# Patient Record
Sex: MALE | Race: WHITE | NOT HISPANIC OR LATINO | Employment: OTHER | URBAN - METROPOLITAN AREA
[De-identification: names, ages, dates, MRNs, and addresses within clinical notes are randomized per-mention and may not be internally consistent; named-entity substitution may affect disease eponyms.]

---

## 2017-09-29 ENCOUNTER — OFFICE VISIT (OUTPATIENT)
Dept: INTERNAL MEDICINE | Age: 57
End: 2017-09-29

## 2017-09-29 VITALS
DIASTOLIC BLOOD PRESSURE: 72 MMHG | TEMPERATURE: 97.6 F | OXYGEN SATURATION: 99 % | SYSTOLIC BLOOD PRESSURE: 116 MMHG | HEART RATE: 77 BPM | WEIGHT: 147.6 LBS | HEIGHT: 67 IN | BODY MASS INDEX: 23.17 KG/M2

## 2017-09-29 DIAGNOSIS — F41.9 ANXIETY: ICD-10-CM

## 2017-09-29 DIAGNOSIS — Z00.00 ROUTINE HEALTH MAINTENANCE: Primary | ICD-10-CM

## 2017-09-29 LAB
DEVELOPER EXPIRATION DATE: NORMAL
DEVELOPER LOT NUMBER: NORMAL
EXPIRATION DATE: NORMAL
FECAL OCCULT BLOOD SCREEN, POC: NEGATIVE
Lab: NORMAL
NEGATIVE CONTROL: NEGATIVE
POSITIVE CONTROL: POSITIVE

## 2017-09-29 PROCEDURE — 99396 PREV VISIT EST AGE 40-64: CPT | Performed by: INTERNAL MEDICINE

## 2017-09-29 PROCEDURE — 82270 OCCULT BLOOD FECES: CPT | Performed by: INTERNAL MEDICINE

## 2017-09-29 RX ORDER — FLECAINIDE ACETATE 50 MG/1
50 TABLET ORAL AS NEEDED
Qty: 60 TABLET | Refills: 1 | COMMUNITY
Start: 2017-09-29 | End: 2017-12-04 | Stop reason: SDUPTHER

## 2017-09-29 RX ORDER — CROMOLYN SODIUM 40 MG/ML
1 SOLUTION/ DROPS OPHTHALMIC 2 TIMES DAILY
Refills: 1 | COMMUNITY
Start: 2017-08-26 | End: 2020-11-20 | Stop reason: ALTCHOICE

## 2017-09-29 RX ORDER — PAROXETINE HYDROCHLORIDE 20 MG/1
20 TABLET, FILM COATED ORAL NIGHTLY
Qty: 30 TABLET | Refills: 3 | Status: SHIPPED | OUTPATIENT
Start: 2017-09-29 | End: 2017-10-25 | Stop reason: SDUPTHER

## 2017-09-29 NOTE — PROGRESS NOTES
"Graeme GUILLORY Walker / 57 y.o. / male  09/29/2017  VITALS    /72  Pulse 77  Temp 97.6 °F (36.4 °C)  Ht 67\" (170.2 cm)  Wt 147 lb 9.6 oz (67 kg)  SpO2 99%  BMI 23.12 kg/m2    BP Readings from Last 3 Encounters:   09/29/17 116/72   09/22/16 114/70   01/25/16 120/70     Wt Readings from Last 3 Encounters:   09/29/17 147 lb 9.6 oz (67 kg)   11/04/16 150 lb (68 kg)   09/22/16 145 lb (65.8 kg)      Body mass index is 23.12 kg/(m^2).    CC:  Main reason(s) for today's visit: No chief complaint on file.      HPI:     Patient presents today for annual physical.    Health maintenance: Last ophthalmology visit early 2017, dentist 2017.  Exercise: Walking 3-4 times per week    Patient Care Team:  Hernando Kwok MD as PCP - General  Hernando Kwok MD as PCP - Family Medicine  ____________________________________________________________________    ASSESSMENT & PLAN:    Problem List Items Addressed This Visit     None        No orders of the defined types were placed in this encounter.      Summary/Discussion:     · #1 routine health maintenance, clinically stable mail, see comments above and below, lab as above will follow-up results online.  Recommend repeat exam one year.  ·   · #2 anxiety/insomnia discontinue trazodone changed to Paxil 20 mg at his bedtime.  He is aware that the sleep inducing function will be present immediately, while the antianxiety medication effect may take 714 days to kick in.  He will follow-up with me by phone in 2 weeks after using the medication to see if he is comfortable with the effect.  That time we may adjust dosage if indicated.  We are in agreement that as of the end of the year when he is retired, we will discontinue this medication.    No Follow-up on file.    Future Appointments  Date Time Provider Department Center   12/4/2017 11:40 AUBRIE Rosales MD [facility] None       ______________________________________________________    REVIEW OF SYSTEMS    Review of Systems "   Constitutional: Negative.    HENT: Negative.    Eyes: Negative.    Respiratory: Negative.    Cardiovascular: Negative.    Gastrointestinal: Negative.    Endocrine: Negative.    Genitourinary: Negative.    Musculoskeletal: Negative.    Skin: Negative.    Allergic/Immunologic: Negative for immunocompromised state.   Neurological: Negative.    Hematological: Negative.    Psychiatric/Behavioral: Positive for sleep disturbance.        Patient is having sleep disturbance at this point because of steady diet of night work.  He works 7PM to 5 AM.  He is also trouble with anxiety regarding need to sleep inability to shut his mind off when this time for rest.  We discontinue trazodone and I have switched him to Paxil at 20 mg at his bedtime.  Hopefully this will improve anxiety and help with sleep as a significant side effect of this medication.         PHYSICAL EXAMINATION    Physical Exam   Constitutional: He is oriented to person, place, and time. He appears well-developed and well-nourished. No distress.   HENT:   Head: Normocephalic and atraumatic.   Right Ear: Tympanic membrane, external ear and ear canal normal.   Left Ear: Tympanic membrane, external ear and ear canal normal.   Mouth/Throat: Uvula is midline, oropharynx is clear and moist and mucous membranes are normal.   Eyes: Conjunctivae and EOM are normal. Pupils are equal, round, and reactive to light.   Neck: Normal range of motion. Neck supple. No JVD present. Carotid bruit is not present. No thyromegaly present.   Cardiovascular: Normal rate, regular rhythm, normal heart sounds and intact distal pulses.  Exam reveals no gallop and no friction rub.    No murmur heard.  Pulmonary/Chest: Effort normal and breath sounds normal. He has no wheezes. He has no rales.   Abdominal: Soft. Bowel sounds are normal. There is no hepatosplenomegaly. There is no tenderness. Hernia confirmed negative in the right inguinal area and confirmed negative in the left inguinal  area.   Genitourinary: Rectum normal, prostate normal, testes normal and penis normal. Prostate is not enlarged.   Musculoskeletal: Normal range of motion. He exhibits no edema or deformity.   Lymphadenopathy:     He has no cervical adenopathy.   Neurological: He is alert and oriented to person, place, and time. He has normal strength and normal reflexes. No cranial nerve deficit or sensory deficit. Coordination normal.   Skin: Skin is warm and dry. No rash noted.   Psychiatric: He has a normal mood and affect. His speech is normal and behavior is normal. Judgment and thought content normal. Cognition and memory are normal.   Nursing note and vitals reviewed.           REVIEWED DATA:    Labs:     Lab Results   Component Value Date     09/14/2016    K 4.3 09/14/2016    AST 19 09/14/2016    ALT 15 09/14/2016    BUN 13 09/14/2016    CREATININE 1.23 09/14/2016    CREATININE 1.10 06/17/2015    CREATININE 1.09 12/12/2014    EGFRIFNONA 61 09/14/2016    EGFRIFAFRI 74 09/14/2016       Lab Results   Component Value Date    GLU 92 09/14/2016    GLU 96 06/17/2015       Lab Results   Component Value Date     (H) 09/14/2016     (H) 06/17/2015    HDL 64 (H) 09/14/2016    TRIG 74 09/14/2016       No results found for: TSH, FREET4    Lab Results   Component Value Date    WBC 8.12 09/14/2016    HGB 14.7 09/14/2016    HGB 14.4 06/17/2015     09/14/2016       Imaging:         Medical Tests:         Summary of old records / correspondence / consultant report:         Request outside records:         ALLERGIES  Allergies   Allergen Reactions   • Sulfa Antibiotics         MEDICATIONS  Current Outpatient Prescriptions on File Prior to Visit   Medication Sig Dispense Refill   • Calcium Citrate-Vitamin D (CALCIUM + D PO) Take  by mouth.     • Multiple Vitamin (MULTI VITAMIN DAILY PO) Take by mouth.     • [DISCONTINUED] flecainide (TAMBOCOR) 50 MG tablet TAKE 1 TABLET TWICE DAILY. 60 tablet 1   • [DISCONTINUED]  methocarbamol (ROBAXIN) 500 MG tablet 1 tablet as needed.  1   • [DISCONTINUED] naproxen (NAPROSYN) 375 MG tablet Take 1 tablet by mouth 2 (Two) Times a Day As Needed for mild pain (1-3). 180 tablet 1   • [DISCONTINUED] traZODone (DESYREL) 50 MG tablet Take 1 tablet by mouth At Night As Needed for sleep. 180 tablet 1     No current facility-administered medications on file prior to visit.        PFSH:     The following portions of the patient's history were reviewed and updated as appropriate: Allergies / Current Medications / Past Medical History / Surgical History / Social History / Family History    PROBLEM LIST   Patient Active Problem List   Diagnosis   • Ventricular premature beats   • Hypercholesterolemia   • Insomnia   • Spondylolisthesis   • Routine health maintenance   • Need for hepatitis C screening test       PAST MEDICAL HISTORY  Past Medical History:   Diagnosis Date   • Acute URI    • Adhesive capsulitis of shoulder     left shoulder pain   • Allergic 1995    Sulfa   • Chronic prostatitis    • Colon polyp 2015    Removed during colonoscopy   • Eosinophilic esophagitis     Noted on EGD biopsy February 11, 2016 by Dr. Chambers   • Esophageal stricture     Dilated September 2016   • Eustachian tube dysfunction    • GERD (gastroesophageal reflux disease)    • Hypercholesteremia    • Low back pain    • Lumbar radiculopathy    • Lumbar spondylosis    • Palpitations    • Premature ventricular contraction    • Tubular adenoma of colon     Colonoscopy 2014-Normal       SURGICAL HISTORY  Past Surgical History:   Procedure Laterality Date   • APPENDECTOMY     • COLONOSCOPY      2014, follow-up 2019   • TONSILLECTOMY         SOCIAL HISTORY  Social History     Social History   • Marital status:      Spouse name: N/A   • Number of children: 1   • Years of education: N/A     Social History Main Topics   • Smoking status: Never Smoker   • Smokeless tobacco: Former User     Types: Chew   • Alcohol use Yes      4 Glasses of wine per week      Comment: Socially   • Drug use: No   • Sexual activity: Yes     Partners: Female     Birth control/ protection: Post-menopausal     Other Topics Concern   • None     Social History Narrative       FAMILY HISTORY  Family History   Problem Relation Age of Onset   • Atrial fibrillation Mother    • Hyperlipidemia Mother    • Hypertension Mother    • Cancer Mother      Pancreatic   • Hearing loss Mother    • Thyroid disease Mother    • Heart failure Father    • Coronary artery disease Father      Four or more arterial bypass grafts   • Cancer Father      Skin   • Heart disease Father    • Hyperlipidemia Father    • Heart disease Other    • Ovarian cancer Maternal Grandmother          **Chika Disclaimer:   Much of this encounter note is an electronic transcription/translation of spoken language to printed text. The electronic translation of spoken language may permit erroneous, or at times, nonsensical words or phrases to be inadvertently transcribed. Although I have reviewed the note for such errors, some may still exist.

## 2017-10-03 LAB
ALBUMIN SERPL-MCNC: 4.4 G/DL (ref 3.5–5.2)
ALBUMIN/GLOB SERPL: 2 G/DL
ALP SERPL-CCNC: 76 U/L (ref 39–117)
ALT SERPL-CCNC: 15 U/L (ref 1–41)
APPEARANCE UR: CLEAR
AST SERPL-CCNC: 16 U/L (ref 1–40)
BASOPHILS # BLD AUTO: 0.04 10*3/MM3 (ref 0–0.2)
BASOPHILS NFR BLD AUTO: 0.5 % (ref 0–1.5)
BILIRUB SERPL-MCNC: 0.6 MG/DL (ref 0.1–1.2)
BILIRUB UR QL STRIP: NEGATIVE
BUN SERPL-MCNC: 17 MG/DL (ref 6–20)
BUN/CREAT SERPL: 12.6 (ref 7–25)
CALCIUM SERPL-MCNC: 9.8 MG/DL (ref 8.6–10.5)
CHLORIDE SERPL-SCNC: 102 MMOL/L (ref 98–107)
CHOLEST SERPL-MCNC: 204 MG/DL (ref 0–200)
CO2 SERPL-SCNC: 27.5 MMOL/L (ref 22–29)
COLOR UR: YELLOW
CREAT SERPL-MCNC: 1.35 MG/DL (ref 0.76–1.27)
EOSINOPHIL # BLD AUTO: 0.39 10*3/MM3 (ref 0–0.7)
EOSINOPHIL NFR BLD AUTO: 4.5 % (ref 0.3–6.2)
ERYTHROCYTE [DISTWIDTH] IN BLOOD BY AUTOMATED COUNT: 13.1 % (ref 11.5–14.5)
GLOBULIN SER CALC-MCNC: 2.2 GM/DL
GLUCOSE SERPL-MCNC: 90 MG/DL (ref 65–99)
GLUCOSE UR QL: NEGATIVE
HCT VFR BLD AUTO: 45.4 % (ref 40.4–52.2)
HDLC SERPL-MCNC: 67 MG/DL (ref 40–60)
HGB BLD-MCNC: 14.8 G/DL (ref 13.7–17.6)
HGB UR QL STRIP: NEGATIVE
IMM GRANULOCYTES # BLD: 0 10*3/MM3 (ref 0–0.03)
IMM GRANULOCYTES NFR BLD: 0 % (ref 0–0.5)
KETONES UR QL STRIP: NEGATIVE
LDLC SERPL CALC-MCNC: 122 MG/DL (ref 0–100)
LEUKOCYTE ESTERASE UR QL STRIP: NEGATIVE
LYMPHOCYTES # BLD AUTO: 2.73 10*3/MM3 (ref 0.9–4.8)
LYMPHOCYTES NFR BLD AUTO: 31.8 % (ref 19.6–45.3)
MCH RBC QN AUTO: 29.4 PG (ref 27–32.7)
MCHC RBC AUTO-ENTMCNC: 32.6 G/DL (ref 32.6–36.4)
MCV RBC AUTO: 90.3 FL (ref 79.8–96.2)
MONOCYTES # BLD AUTO: 0.79 10*3/MM3 (ref 0.2–1.2)
MONOCYTES NFR BLD AUTO: 9.2 % (ref 5–12)
NEUTROPHILS # BLD AUTO: 4.63 10*3/MM3 (ref 1.9–8.1)
NEUTROPHILS NFR BLD AUTO: 54 % (ref 42.7–76)
NITRITE UR QL STRIP: NEGATIVE
PH UR STRIP: 6.5 [PH] (ref 5–8)
PLATELET # BLD AUTO: 258 10*3/MM3 (ref 140–500)
POTASSIUM SERPL-SCNC: 4.5 MMOL/L (ref 3.5–5.2)
PROT SERPL-MCNC: 6.6 G/DL (ref 6–8.5)
PROT UR QL STRIP: NEGATIVE
PSA SERPL-MCNC: 0.86 NG/ML (ref 0–4)
RBC # BLD AUTO: 5.03 10*6/MM3 (ref 4.6–6)
SODIUM SERPL-SCNC: 144 MMOL/L (ref 136–145)
SP GR UR: 1.03 (ref 1–1.03)
TRIGL SERPL-MCNC: 75 MG/DL (ref 0–150)
UROBILINOGEN UR STRIP-MCNC: NORMAL MG/DL
VLDLC SERPL CALC-MCNC: 15 MG/DL (ref 5–40)
WBC # BLD AUTO: 8.58 10*3/MM3 (ref 4.5–10.7)

## 2017-10-25 DIAGNOSIS — G47.00 INSOMNIA, UNSPECIFIED TYPE: ICD-10-CM

## 2017-10-25 DIAGNOSIS — F41.9 ANXIETY: Primary | ICD-10-CM

## 2017-10-25 RX ORDER — PAROXETINE HYDROCHLORIDE 20 MG/1
20 TABLET, FILM COATED ORAL NIGHTLY
Qty: 90 TABLET | Refills: 1 | Status: SHIPPED | OUTPATIENT
Start: 2017-10-25 | End: 2018-02-23

## 2017-12-04 ENCOUNTER — OFFICE VISIT (OUTPATIENT)
Dept: CARDIOLOGY | Facility: CLINIC | Age: 57
End: 2017-12-04

## 2017-12-04 VITALS
HEART RATE: 77 BPM | HEIGHT: 67 IN | SYSTOLIC BLOOD PRESSURE: 128 MMHG | DIASTOLIC BLOOD PRESSURE: 88 MMHG | BODY MASS INDEX: 23.07 KG/M2 | WEIGHT: 147 LBS

## 2017-12-04 DIAGNOSIS — I49.3 VENTRICULAR PREMATURE BEATS: Primary | ICD-10-CM

## 2017-12-04 PROCEDURE — 93000 ELECTROCARDIOGRAM COMPLETE: CPT | Performed by: INTERNAL MEDICINE

## 2017-12-04 PROCEDURE — 99212 OFFICE O/P EST SF 10 MIN: CPT | Performed by: INTERNAL MEDICINE

## 2017-12-04 RX ORDER — FLECAINIDE ACETATE 50 MG/1
50 TABLET ORAL AS NEEDED
Qty: 60 TABLET | Refills: 1 | Status: SHIPPED | OUTPATIENT
Start: 2017-12-04 | End: 2020-12-28

## 2017-12-04 NOTE — PROGRESS NOTES
Date of Office Visit: 2017  Encounter Provider: Parmjit Rosales MD  Place of Service: [facility] CARDIOLOGY  Patient Name: Graeme Lopez  : 1960    Subjective:     Encounter Date:2017      Patient ID: Graeme Lopez is a 57 y.o. male who has a cc of benign PVCs who has had to take prn flec maybe 10 times in 2 years. No trigger for palp/. Last maybe an hour or so.     The patient had a good year.   No anginal chest pain,   No sig lagunas,   No soa,   No fainting,  No orthostasis.   No edema.   Exercise tolerance: plays golf and walks stairs at work.     There have been no hospital admission since the last visit.     There have been no bleeding events.       Past Medical History:   Diagnosis Date   • Acute URI    • Adhesive capsulitis of shoulder     left shoulder pain   • Allergic 1995    Sulfa   • Chronic prostatitis    • Colon polyp     Removed during colonoscopy   • Eosinophilic esophagitis     Noted on EGD biopsy 2016 by Dr. Chambers   • Esophageal stricture     Dilated 2016   • Eustachian tube dysfunction    • GERD (gastroesophageal reflux disease)    • Hypercholesteremia    • Low back pain    • Lumbar radiculopathy    • Lumbar spondylosis    • Palpitations    • Premature ventricular contraction    • Tubular adenoma of colon     Colonoscopy -Normal       Social History     Social History   • Marital status:      Spouse name: N/A   • Number of children: 1   • Years of education: N/A     Occupational History   • Not on file.     Social History Main Topics   • Smoking status: Never Smoker   • Smokeless tobacco: Never Used   • Alcohol use Yes     4 Glasses of wine per week      Comment: Socially   • Drug use: No   • Sexual activity: Yes     Partners: Female     Birth control/ protection: Post-menopausal     Other Topics Concern   • Not on file     Social History Narrative       Review of Systems   Constitution: Negative for fever  "and night sweats.   HENT: Negative for ear pain and stridor.    Eyes: Negative for discharge and visual halos.   Cardiovascular: Negative for cyanosis.   Respiratory: Negative for hemoptysis and sputum production.    Hematologic/Lymphatic: Negative for adenopathy.   Skin: Negative for nail changes and unusual hair distribution.   Musculoskeletal: Negative for gout and joint swelling.   Gastrointestinal: Negative for bowel incontinence and flatus.   Genitourinary: Negative for dysuria and flank pain.   Neurological: Negative for seizures and tremors.   Psychiatric/Behavioral: Negative for altered mental status. The patient is not nervous/anxious.             Objective:     Vitals:    12/04/17 1147   BP: 128/88   Pulse: 77   Weight: 147 lb (66.7 kg)   Height: 67\" (170.2 cm)         Physical Exam   Constitutional: He is oriented to person, place, and time.   HENT:   Head: Normocephalic and atraumatic.   Eyes: Right eye exhibits no discharge. Left eye exhibits no discharge.   Neck: No JVD present. No thyromegaly present.   Cardiovascular: Normal rate and regular rhythm.  Exam reveals no gallop and no friction rub.    No murmur heard.  Pulmonary/Chest: Effort normal and breath sounds normal. He has no rales.   Abdominal: Soft. Bowel sounds are normal. There is no tenderness.   Musculoskeletal: Normal range of motion. He exhibits no edema or deformity.   Neurological: He is alert and oriented to person, place, and time. He exhibits normal muscle tone.   Skin: Skin is warm and dry. No erythema.   Psychiatric: He has a normal mood and affect. His behavior is normal. Thought content normal.         ECG 12 Lead  Date/Time: 12/4/2017 12:04 PM  Performed by: JOSE LUU  Authorized by: JOSE LUU   Comparison: compared with previous ECG   Similar to previous ECG  Clinical impression: normal ECG            Lab Review:       Assessment:          Diagnosis Plan   1. Ventricular premature beats            Plan:       Doing " great -- rare PVCs   Occ prn flec is fine.   Exam and ECG normal.

## 2017-12-22 DIAGNOSIS — Z13.228 SCREENING FOR METABOLIC DISORDER: Primary | ICD-10-CM

## 2018-01-03 LAB
FERRITIN SERPL-MCNC: 106.8 NG/ML (ref 30–400)
HFE GENE MUT ANL BLD/T: NORMAL
IRON SATN MFR SERPL: 30 % (ref 20–50)
IRON SERPL-MCNC: 95 MCG/DL (ref 59–158)
TIBC SERPL-MCNC: 318 MCG/DL
UIBC SERPL-MCNC: 223 MCG/DL

## 2018-01-04 NOTE — PROGRESS NOTES
Dr Kwok's lab screening for hematologic disorder is normal and there is nothing to suggest any problems.

## 2018-01-22 ENCOUNTER — RESULTS ENCOUNTER (OUTPATIENT)
Dept: INTERNAL MEDICINE | Age: 58
End: 2018-01-22

## 2018-01-22 DIAGNOSIS — Z13.228 SCREENING FOR METABOLIC DISORDER: ICD-10-CM

## 2018-02-23 ENCOUNTER — OFFICE VISIT (OUTPATIENT)
Dept: INTERNAL MEDICINE | Age: 58
End: 2018-02-23

## 2018-02-23 VITALS
SYSTOLIC BLOOD PRESSURE: 110 MMHG | WEIGHT: 150.6 LBS | BODY MASS INDEX: 23.64 KG/M2 | HEART RATE: 86 BPM | HEIGHT: 67 IN | OXYGEN SATURATION: 98 % | DIASTOLIC BLOOD PRESSURE: 80 MMHG | TEMPERATURE: 98.4 F

## 2018-02-23 DIAGNOSIS — J01.90 ACUTE SINUSITIS, RECURRENCE NOT SPECIFIED, UNSPECIFIED LOCATION: Primary | ICD-10-CM

## 2018-02-23 PROCEDURE — 99213 OFFICE O/P EST LOW 20 MIN: CPT | Performed by: INTERNAL MEDICINE

## 2018-02-23 RX ORDER — BENZONATATE 200 MG/1
200 CAPSULE ORAL 3 TIMES DAILY PRN
Qty: 30 CAPSULE | Refills: 0 | Status: SHIPPED | OUTPATIENT
Start: 2018-02-23 | End: 2019-01-22

## 2018-02-23 RX ORDER — DOXYCYCLINE 100 MG/1
1 CAPSULE ORAL 2 TIMES DAILY PRN
Refills: 11 | COMMUNITY
Start: 2018-02-17 | End: 2022-04-08

## 2018-02-23 RX ORDER — AMOXICILLIN AND CLAVULANATE POTASSIUM 875; 125 MG/1; MG/1
1 TABLET, FILM COATED ORAL EVERY 12 HOURS SCHEDULED
Qty: 20 TABLET | Refills: 0 | Status: SHIPPED | OUTPATIENT
Start: 2018-02-23 | End: 2019-01-22

## 2018-02-23 NOTE — PROGRESS NOTES
"Graeme GUILLORY Walker / 58 y.o. / male  02/23/2018  VITALS    Visit Vitals   • /80   • Pulse 86   • Temp 98.4 °F (36.9 °C)   • Ht 170.2 cm (67.01\")   • Wt 68.3 kg (150 lb 9.6 oz)   • SpO2 98%   • BMI 23.58 kg/m2       BP Readings from Last 3 Encounters:   02/23/18 110/80   12/04/17 128/88   09/29/17 116/72     Wt Readings from Last 3 Encounters:   02/23/18 68.3 kg (150 lb 9.6 oz)   12/04/17 66.7 kg (147 lb)   09/29/17 67 kg (147 lb 9.6 oz)      Body mass index is 23.58 kg/(m^2).    CC:  Main reason(s) for today's visit: Sinus Problem; Neck Pain; and Cough      HPI:     Patient presents today with upper respiratory symptoms over the past week to 10 days.  He notes sinus drainage which is purulent, coughing, (purulent).  He is a nonsmoker,, and has had immunization for influenza this season.  Home treatment: Tylenol, Advil, antihistamine over-the-counter.  His daughter had a similar illness several weeks back.  There's been no history of air travel.  Patient denied facial and or dental pain.    Patient Care Team:  Hernando Kwok MD as PCP - General  Hernando Kwok MD as PCP - Family Medicine  ____________________________________________________________________    ASSESSMENT & PLAN:    Problem List Items Addressed This Visit     None      Visit Diagnoses     Acute sinusitis, recurrence not specified, unspecified location    -  Primary    Relevant Medications    amoxicillin-clavulanate (AUGMENTIN) 875-125 MG per tablet    benzonatate (TESSALON) 200 MG capsule        No orders of the defined types were placed in this encounter.      Summary/Discussion:  · Number-one sinusitis by history.  Will treat with Augmentin 875 mg twice a day for 10 days, Tessalon 200 mg 3 times a day as needed for cough.  Patient will also get Sudafed PE decongestant over-the-counter, Ocean Spray 4 times daily, and use a vaporizer the bedroom at nighttime.  Patient follow-up with me as needed if symptoms are not improved his satisfaction " within 10-14 days.    No Follow-up on file.    Future Appointments  Date Time Provider Department Center   10/2/2018 8:00 AM Hernando Kwok MD [facility] None       ______________________________________________________    REVIEW OF SYSTEMS    Review of Systems   Constitutional: Negative for appetite change, chills, diaphoresis and fever.   HENT: Negative for ear pain and sore throat.    Respiratory: Positive for cough.         Noted with drainage.   Gastrointestinal: Negative for diarrhea, nausea and vomiting.   Musculoskeletal:        Sore neck   Skin: Negative for rash.   Neurological: Positive for headaches.   Hematological: Negative for adenopathy.           PHYSICAL EXAMINATION    Physical Exam   Constitutional: He is oriented to person, place, and time. He appears well-developed and well-nourished. No distress.   HENT:   Right Ear: Tympanic membrane and ear canal normal.   Left Ear: Tympanic membrane and ear canal normal.   Nose: Right sinus exhibits no maxillary sinus tenderness and no frontal sinus tenderness. Left sinus exhibits no maxillary sinus tenderness and no frontal sinus tenderness.   Mouth/Throat: Posterior oropharyngeal erythema present. No posterior oropharyngeal edema.   Neck: Normal range of motion. Neck supple.   Pulmonary/Chest: Effort normal and breath sounds normal.   No egophony noted   Lymphadenopathy:     He has no cervical adenopathy.   Neurological: He is alert and oriented to person, place, and time.   Skin: Skin is warm and dry. He is not diaphoretic.   Psychiatric: He has a normal mood and affect. His behavior is normal. Judgment and thought content normal.   Nursing note and vitals reviewed.        REVIEWED DATA:    Labs:     Lab Results   Component Value Date     10/03/2017    K 4.5 10/03/2017    AST 16 10/03/2017    ALT 15 10/03/2017    BUN 17 10/03/2017    CREATININE 1.35 (H) 10/03/2017    CREATININE 1.23 09/14/2016    CREATININE 1.10 06/17/2015    EGFRIFNONA 55 (L)  10/03/2017    EGFRIFAFRI 67 10/03/2017       Lab Results   Component Value Date    GLU 90 10/03/2017    GLU 92 09/14/2016    GLU 96 06/17/2015       Lab Results   Component Value Date     (H) 10/03/2017     (H) 09/14/2016     (H) 06/17/2015    HDL 67 (H) 10/03/2017    TRIG 75 10/03/2017       No results found for: TSH, FREET4    Lab Results   Component Value Date    WBC 8.58 10/03/2017    HGB 14.8 10/03/2017    HGB 14.7 09/14/2016    HGB 14.4 06/17/2015     10/03/2017       Imaging:         Medical Tests:         Summary of old records / correspondence / consultant report:         Request outside records:         ALLERGIES  Allergies   Allergen Reactions   • Sulfa Antibiotics         MEDICATIONS  Current Outpatient Prescriptions   Medication Sig Dispense Refill   • amoxicillin-clavulanate (AUGMENTIN) 875-125 MG per tablet Take 1 tablet by mouth Every 12 (Twelve) Hours. 20 tablet 0   • benzonatate (TESSALON) 200 MG capsule Take 1 capsule by mouth 3 (Three) Times a Day As Needed for Cough. 30 capsule 0   • Calcium Citrate-Vitamin D (CALCIUM + D PO) Take  by mouth.     • cromolyn (OPTICROM) 4 % ophthalmic solution 1 drop 2 (Two) Times a Day.  1   • doxycycline (MONODOX) 100 MG capsule 1 capsule 2 (Two) Times a Day.  11   • flecainide (TAMBOCOR) 50 MG tablet Take 1 tablet by mouth As Needed (Palpitations). 60 tablet 1   • Multiple Vitamin (MULTI VITAMIN DAILY PO) Take by mouth.       No current facility-administered medications for this visit.        PFSH:     The following portions of the patient's history were reviewed and updated as appropriate: Allergies / Current Medications / Past Medical History / Surgical History / Social History / Family History    PROBLEM LIST   Patient Active Problem List   Diagnosis   • Ventricular premature beats   • Hypercholesterolemia   • Insomnia   • Spondylolisthesis   • Routine health maintenance   • Need for hepatitis C screening test   • Screening for  metabolic disorder       PAST MEDICAL HISTORY  Past Medical History:   Diagnosis Date   • Acute URI    • Adhesive capsulitis of shoulder     left shoulder pain   • Allergic 1995    Sulfa   • Chronic prostatitis    • Colon polyp 2015    Removed during colonoscopy   • Eosinophilic esophagitis     Noted on EGD biopsy February 11, 2016 by Dr. Chambers   • Esophageal stricture     Dilated September 2016   • Eustachian tube dysfunction    • GERD (gastroesophageal reflux disease)    • Hypercholesteremia    • Low back pain    • Lumbar radiculopathy    • Lumbar spondylosis    • Palpitations    • Premature ventricular contraction    • Skin cancer    • Tubular adenoma of colon     Colonoscopy 2014-Normal       SURGICAL HISTORY  Past Surgical History:   Procedure Laterality Date   • APPENDECTOMY     • COLONOSCOPY      2014, follow-up 2019   • SKIN CANCER EXCISION     • TONSILLECTOMY         SOCIAL HISTORY  Social History     Social History   • Marital status:      Spouse name: N/A   • Number of children: 1   • Years of education: N/A     Social History Main Topics   • Smoking status: Never Smoker   • Smokeless tobacco: Never Used   • Alcohol use Yes     4 Glasses of wine per week      Comment: Socially   • Drug use: No   • Sexual activity: Yes     Partners: Female     Birth control/ protection: Post-menopausal     Other Topics Concern   • None     Social History Narrative   • None       FAMILY HISTORY  Family History   Problem Relation Age of Onset   • Atrial fibrillation Mother    • Hyperlipidemia Mother    • Hypertension Mother    • Cancer Mother      Pancreatic   • Hearing loss Mother    • Thyroid disease Mother    • Heart failure Father    • Coronary artery disease Father      Four or more arterial bypass grafts   • Cancer Father      Skin   • Heart disease Father    • Hyperlipidemia Father    • Heart disease Other    • Ovarian cancer Maternal Grandmother          **Chika Disclaimer:   Much of this encounter note  is an electronic transcription/translation of spoken language to printed text. The electronic translation of spoken language may permit erroneous, or at times, nonsensical words or phrases to be inadvertently transcribed. Although I have reviewed the note for such errors, some may still exist.

## 2018-11-21 PROBLEM — C44.90 SKIN CANCER: Status: ACTIVE | Noted: 2018-11-21

## 2019-01-22 ENCOUNTER — OFFICE VISIT (OUTPATIENT)
Dept: INTERNAL MEDICINE | Age: 59
End: 2019-01-22

## 2019-01-22 VITALS
HEIGHT: 67 IN | HEART RATE: 79 BPM | SYSTOLIC BLOOD PRESSURE: 146 MMHG | BODY MASS INDEX: 23.48 KG/M2 | WEIGHT: 149.6 LBS | OXYGEN SATURATION: 96 % | TEMPERATURE: 98.1 F | DIASTOLIC BLOOD PRESSURE: 82 MMHG

## 2019-01-22 DIAGNOSIS — G47.00 INSOMNIA, UNSPECIFIED TYPE: ICD-10-CM

## 2019-01-22 DIAGNOSIS — Z00.00 ROUTINE HEALTH MAINTENANCE: Primary | ICD-10-CM

## 2019-01-22 PROCEDURE — 99396 PREV VISIT EST AGE 40-64: CPT | Performed by: INTERNAL MEDICINE

## 2019-01-22 RX ORDER — TRAZODONE HYDROCHLORIDE 50 MG/1
50 TABLET ORAL AS NEEDED
Qty: 30 TABLET | Refills: 2 | Status: SHIPPED | OUTPATIENT
Start: 2019-01-22 | End: 2020-11-20

## 2019-01-22 NOTE — PROGRESS NOTES
"[facility] [place] INTERNAL MEDICINE  MD Graeme KRISHNAMURTHY KAHLIL Walker / 58 y.o. / male  01/22/2019      ASSESSMENT & PLAN:    Problem List Items Addressed This Visit        Unprioritized    Insomnia    Relevant Medications    traZODone (DESYREL) 50 MG tablet    Routine health maintenance - Primary    Relevant Orders    CBC & Differential    Comprehensive Metabolic Panel    Lipid Panel        Orders Placed This Encounter   Procedures   • Comprehensive Metabolic Panel   • Lipid Panel   • CBC & Differential       Summary/Discussion:    Number-one routine health maintenance, clinically stable mail exam, see comments above and below.  Recommend patient continue same and repeat exam in one year.  Laboratory as above, follow-up results online.  He'll draw the lab when fasting.    #2 insomnia, adjusting to illumination of night shift from his routine.  Continue to use trazodone prn  until symptoms are resolved.    Patient was referred to the [facility] liaison office to secure a new primary care physician.      Return in about 1 year (around 1/22/2020) for Annual physical.    ____________________________________________________________________    VITALS    Visit Vitals  /82   Pulse 79   Temp 98.1 °F (36.7 °C)   Ht 170.2 cm (67.01\")   Wt 67.9 kg (149 lb 9.6 oz)   SpO2 96%   BMI 23.43 kg/m²       BP Readings from Last 3 Encounters:   01/22/19 146/82   02/23/18 110/80   12/04/17 128/88     Wt Readings from Last 3 Encounters:   01/22/19 67.9 kg (149 lb 9.6 oz)   02/23/18 68.3 kg (150 lb 9.6 oz)   12/04/17 66.7 kg (147 lb)      Body mass index is 23.43 kg/m².    CC:  Main reason(s) for today's visit: Annual Exam (CPE)      HPI: Presents this afternoon for annual physical exam.    Health maintenance: Last ophthalmology visit December 2018, dentist #[id number].  Exercise regularly, see below for details.            Patient Care Team:  Hernando Kwok MD as PCP - General  Hernando Kwok MD as PCP - Family " Medicine  ____________________________________________________________________    REVIEW OF SYSTEMS    Review of Systems   Constitutional: Negative for appetite change, chills, diaphoresis, fatigue and fever.   HENT: Negative for hearing loss and trouble swallowing.    Eyes: Negative for visual disturbance.   Respiratory: Negative for cough, chest tightness, shortness of breath and wheezing.    Cardiovascular: Negative for chest pain, palpitations and leg swelling.   Gastrointestinal: Negative for abdominal pain, constipation, diarrhea, nausea and vomiting.   Endocrine: Negative for cold intolerance, heat intolerance, polydipsia, polyphagia and polyuria.   Genitourinary: Negative for dysuria, frequency and hematuria.   Musculoskeletal: Negative for joint swelling and myalgias.   Skin: Negative for color change and rash.        Patient has had basal cell carcinomas removed by dermatology, he does see them regularly for follow-up.   Allergic/Immunologic: Negative for immunocompromised state.   Neurological: Negative for dizziness, syncope, weakness, light-headedness, numbness and headaches.   Hematological: Negative for adenopathy. Does not bruise/bleed easily.   Psychiatric/Behavioral: Positive for sleep disturbance.        Patient continues to have issues with insomnia.  He did work for quite a while night shift at [employer].  He has recently retired, is still having some difficulty recently his biological clock.  He has used trazodone in the past, 50 mg is not been helpful we did discuss increasing the dosage to 100 mg per night.         PHYSICAL EXAMINATION    Physical Exam   Constitutional: He is oriented to person, place, and time. He appears well-developed and well-nourished. No distress.   HENT:   Head: Normocephalic and atraumatic.   Right Ear: Tympanic membrane, external ear and ear canal normal.   Left Ear: Tympanic membrane, external ear and ear canal normal.   Mouth/Throat: Uvula is midline, oropharynx is clear  and moist and mucous membranes are normal.   Eyes: Conjunctivae and EOM are normal. Pupils are equal, round, and reactive to light.   Neck: Normal range of motion. Neck supple. No JVD present. Carotid bruit is not present. No thyromegaly present.   Cardiovascular: Normal rate, regular rhythm, normal heart sounds and intact distal pulses. Exam reveals no gallop and no friction rub.   No murmur heard.  Pulmonary/Chest: Effort normal and breath sounds normal. He has no wheezes. He has no rales.   Abdominal: Soft. Bowel sounds are normal. There is no hepatosplenomegaly. There is no tenderness.   Genitourinary:   Genitourinary Comments: DEFER TO UROLOGY   Musculoskeletal: Normal range of motion. He exhibits no edema or deformity.   Lymphadenopathy:     He has no cervical adenopathy.   Neurological: He is alert and oriented to person, place, and time. He has normal strength and normal reflexes. No cranial nerve deficit or sensory deficit. Coordination normal.   Skin: Skin is warm and dry. No rash noted.   Psychiatric: He has a normal mood and affect. His speech is normal and behavior is normal. Judgment and thought content normal. Cognition and memory are normal.   Nursing note and vitals reviewed.        REVIEWED DATA:    Labs:     Lab Results   Component Value Date     10/03/2017    K 4.5 10/03/2017    AST 16 10/03/2017    ALT 15 10/03/2017    BUN 17 10/03/2017    CREATININE 1.35 (H) 10/03/2017    CREATININE 1.23 09/14/2016    CREATININE 1.10 06/17/2015    EGFRIFNONA 55 (L) 10/03/2017    EGFRIFAFRI 67 10/03/2017       No results found for: HGBA1C, GLUCOSE, MICROALBUR    Lab Results   Component Value Date     (H) 10/03/2017     (H) 09/14/2016     (H) 06/17/2015    HDL 67 (H) 10/03/2017    TRIG 75 10/03/2017       No results found for: TSH, FREET4    Lab Results   Component Value Date    WBC 8.58 10/03/2017    HGB 14.8 10/03/2017    HGB 14.7 09/14/2016    HGB 14.4 06/17/2015      10/03/2017       Lab Results   Component Value Date    PSA 0.861 10/03/2017         Imaging:         Medical Tests:         Summary of old records / correspondence / consultant report:         Request outside records:         ALLERGIES  Allergies   Allergen Reactions   • Sulfa Antibiotics         MEDICATIONS  Current Outpatient Medications   Medication Sig Dispense Refill   • Calcium Citrate-Vitamin D (CALCIUM + D PO) Take  by mouth.     • cromolyn (OPTICROM) 4 % ophthalmic solution 1 drop 2 (Two) Times a Day.  1   • doxycycline (MONODOX) 100 MG capsule Take 1 capsule by mouth 2 (Two) Times a Day As Needed.  11   • flecainide (TAMBOCOR) 50 MG tablet Take 1 tablet by mouth As Needed (Palpitations). 60 tablet 1   • Multiple Vitamin (MULTI VITAMIN DAILY PO) Take by mouth.     • traZODone (DESYREL) 50 MG tablet Take 1 tablet by mouth As Needed for Sleep. 30 tablet 2     No current facility-administered medications for this visit.        PFSH:     The following portions of the patient's history were reviewed and updated as appropriate: Allergies / Current Medications / Past Medical History / Surgical History / Social History / Family History    PROBLEM LIST   Patient Active Problem List   Diagnosis   • Ventricular premature beats   • Hypercholesterolemia   • Insomnia   • Spondylolisthesis   • Routine health maintenance   • Need for hepatitis C screening test   • Screening for metabolic disorder   • Skin cancer       PAST MEDICAL HISTORY  Past Medical History:   Diagnosis Date   • Acute URI    • Adhesive capsulitis of shoulder     left shoulder pain   • Allergic 1995    Sulfa   • Chronic prostatitis    • Colon polyp 2015    Removed during colonoscopy   • Eosinophilic esophagitis     Noted on EGD biopsy February 11, 2016 by Dr. Chambers   • Esophageal stricture     Dilated September 2016   • Eustachian tube dysfunction    • GERD (gastroesophageal reflux disease)    • Hypercholesteremia    • Low back pain    • Lumbar  radiculopathy    • Lumbar spondylosis    • Palpitations    • Premature ventricular contraction    • Skin cancer    • Tubular adenoma of colon     Colonoscopy 2014-Normal       SURGICAL HISTORY  Past Surgical History:   Procedure Laterality Date   • APPENDECTOMY     • COLONOSCOPY      2014, follow-up 2019   • SKIN CANCER EXCISION     • TONSILLECTOMY         SOCIAL HISTORY  Social History     Socioeconomic History   • Marital status:      Spouse name: Not on file   • Number of children: 1   • Years of education: Not on file   • Highest education level: Not on file   Tobacco Use   • Smoking status: Never Smoker   • Smokeless tobacco: Never Used   Substance and Sexual Activity   • Alcohol use: Yes     Types: 2 Glasses of wine per week     Comment: Socially   • Drug use: No   • Sexual activity: Yes     Partners: Female     Birth control/protection: Post-menopausal       FAMILY HISTORY  Family History   Problem Relation Age of Onset   • Atrial fibrillation Mother    • Hyperlipidemia Mother    • Hypertension Mother    • Cancer Mother         Pancreatic   • Hearing loss Mother    • Thyroid disease Mother    • Heart disease Mother         Atrial fibrillation   • Heart failure Father    • Coronary artery disease Father         Four or more arterial bypass grafts   • Cancer Father         Skin   • Heart disease Father         Coronary artery disease   • Hyperlipidemia Father    • Heart disease Other    • Ovarian cancer Maternal Grandmother        Health Maintenance Due   Topic   • ZOSTER VACCINE (1 of 2)   • LIPID PANEL        Overdue health maintenance interventions  reviewed with patient.    Dictated utilizing Dragon voice recognition software

## 2019-01-24 LAB
ALBUMIN SERPL-MCNC: 4.5 G/DL (ref 3.5–5.2)
ALBUMIN/GLOB SERPL: 1.8 G/DL
ALP SERPL-CCNC: 86 U/L (ref 39–117)
ALT SERPL-CCNC: 18 U/L (ref 1–41)
AST SERPL-CCNC: 17 U/L (ref 1–40)
BASOPHILS # BLD AUTO: 0.05 10*3/MM3 (ref 0–0.2)
BASOPHILS NFR BLD AUTO: 0.6 % (ref 0–1.5)
BILIRUB SERPL-MCNC: 0.6 MG/DL (ref 0.1–1.2)
BUN SERPL-MCNC: 14 MG/DL (ref 6–20)
BUN/CREAT SERPL: 12.6 (ref 7–25)
CALCIUM SERPL-MCNC: 9.9 MG/DL (ref 8.6–10.5)
CHLORIDE SERPL-SCNC: 102 MMOL/L (ref 98–107)
CHOLEST SERPL-MCNC: 205 MG/DL (ref 0–200)
CO2 SERPL-SCNC: 28.5 MMOL/L (ref 22–29)
CREAT SERPL-MCNC: 1.11 MG/DL (ref 0.76–1.27)
EOSINOPHIL # BLD AUTO: 0.5 10*3/MM3 (ref 0–0.7)
EOSINOPHIL NFR BLD AUTO: 5.7 % (ref 0.3–6.2)
ERYTHROCYTE [DISTWIDTH] IN BLOOD BY AUTOMATED COUNT: 13.2 % (ref 11.5–14.5)
GLOBULIN SER CALC-MCNC: 2.5 GM/DL
GLUCOSE SERPL-MCNC: 87 MG/DL (ref 65–99)
HCT VFR BLD AUTO: 48.3 % (ref 40.4–52.2)
HDLC SERPL-MCNC: 69 MG/DL (ref 40–60)
HGB BLD-MCNC: 16 G/DL (ref 13.7–17.6)
IMM GRANULOCYTES # BLD AUTO: 0.02 10*3/MM3 (ref 0–0.03)
IMM GRANULOCYTES NFR BLD AUTO: 0.2 % (ref 0–0.5)
LDLC SERPL CALC-MCNC: 118 MG/DL (ref 0–100)
LYMPHOCYTES # BLD AUTO: 2.26 10*3/MM3 (ref 0.9–4.8)
LYMPHOCYTES NFR BLD AUTO: 25.6 % (ref 19.6–45.3)
MCH RBC QN AUTO: 30.5 PG (ref 27–32.7)
MCHC RBC AUTO-ENTMCNC: 33.1 G/DL (ref 32.6–36.4)
MCV RBC AUTO: 92.2 FL (ref 79.8–96.2)
MONOCYTES # BLD AUTO: 0.82 10*3/MM3 (ref 0.2–1.2)
MONOCYTES NFR BLD AUTO: 9.3 % (ref 5–12)
NEUTROPHILS # BLD AUTO: 5.19 10*3/MM3 (ref 1.9–8.1)
NEUTROPHILS NFR BLD AUTO: 58.6 % (ref 42.7–76)
PLATELET # BLD AUTO: 258 10*3/MM3 (ref 140–500)
POTASSIUM SERPL-SCNC: 4.6 MMOL/L (ref 3.5–5.2)
PROT SERPL-MCNC: 7 G/DL (ref 6–8.5)
RBC # BLD AUTO: 5.24 10*6/MM3 (ref 4.6–6)
SODIUM SERPL-SCNC: 142 MMOL/L (ref 136–145)
TRIGL SERPL-MCNC: 89 MG/DL (ref 0–150)
VLDLC SERPL CALC-MCNC: 17.8 MG/DL (ref 5–40)
WBC # BLD AUTO: 8.84 10*3/MM3 (ref 4.5–10.7)

## 2019-04-23 ENCOUNTER — OFFICE VISIT (OUTPATIENT)
Dept: FAMILY MEDICINE CLINIC | Facility: CLINIC | Age: 59
End: 2019-04-23

## 2019-04-23 VITALS
RESPIRATION RATE: 16 BRPM | HEART RATE: 69 BPM | WEIGHT: 150 LBS | SYSTOLIC BLOOD PRESSURE: 120 MMHG | BODY MASS INDEX: 23.54 KG/M2 | DIASTOLIC BLOOD PRESSURE: 78 MMHG | OXYGEN SATURATION: 99 % | TEMPERATURE: 98.6 F | HEIGHT: 67 IN

## 2019-04-23 DIAGNOSIS — E78.5 HYPERLIPIDEMIA, UNSPECIFIED HYPERLIPIDEMIA TYPE: ICD-10-CM

## 2019-04-23 DIAGNOSIS — I49.3 VENTRICULAR PREMATURE BEATS: ICD-10-CM

## 2019-04-23 DIAGNOSIS — G47.00 INSOMNIA, UNSPECIFIED TYPE: ICD-10-CM

## 2019-04-23 DIAGNOSIS — B35.1 ONYCHOMYCOSIS: Primary | ICD-10-CM

## 2019-04-23 PROBLEM — Z13.228 SCREENING FOR METABOLIC DISORDER: Status: RESOLVED | Noted: 2017-12-22 | Resolved: 2019-04-23

## 2019-04-23 PROCEDURE — 99214 OFFICE O/P EST MOD 30 MIN: CPT | Performed by: FAMILY MEDICINE

## 2019-04-23 NOTE — PROGRESS NOTES
Graeme Lopez is a 59 y.o. male.     Chief Complaint   Patient presents with   • Establish Care     Patient needs to establish a care.    • Melanoma     patient has a history of skin cancer, he has a spot on his head needs to be checked.       HPI     Patient presents to the office today to discuss issues that are all new to me.  Patient has a history of ventricular beats well managed with as needed use of flecainide.  Was seeing cardiology.  Also suffers from insomnia and uses trazodone on a as needed basis.  Usually only when traveling.  Patient was also found to have slightly elevated cholesterol levels.  Not taking any medication for this.  Using a healthy diet and exercise.  Does have thick brittle nails on his bilateral toes.    The following portions of the patient's history were reviewed and updated as appropriate: allergies, current medications, past family history, past medical history, past social history, past surgical history and problem list.    Review of Systems   Skin: Positive for color change. Negative for rash and wound.   All other systems reviewed and are negative.      Objective  Vitals:    04/23/19 1245   BP: 120/78   Pulse: 69   Resp: 16   Temp: 98.6 °F (37 °C)   SpO2: 99%       Physical Exam   Constitutional: He is oriented to person, place, and time. He appears well-developed and well-nourished. No distress.   HENT:   Head: Normocephalic and atraumatic.   Right Ear: External ear normal.   Left Ear: External ear normal.   Nose: Nose normal.   Mouth/Throat: Oropharynx is clear and moist.   Eyes: Conjunctivae and EOM are normal. Pupils are equal, round, and reactive to light. Right eye exhibits no discharge. Left eye exhibits no discharge. No scleral icterus.   Neck: Normal range of motion. Neck supple.   Cardiovascular: Normal rate, regular rhythm and normal heart sounds. Exam reveals no friction rub.   No murmur heard.  Pulmonary/Chest: Effort normal and breath sounds normal. No  respiratory distress. He has no wheezes. He has no rales.   Abdominal: Soft. Bowel sounds are normal. He exhibits no distension. There is no tenderness. There is no rebound and no guarding.   Lymphadenopathy:     He has no cervical adenopathy.   Neurological: He is alert and oriented to person, place, and time.   Skin: Skin is warm and dry. He is not diaphoretic.   Nursing note and vitals reviewed.        Current Outpatient Medications:   •  Calcium Citrate-Vitamin D (CALCIUM + D PO), Take  by mouth., Disp: , Rfl:   •  cromolyn (OPTICROM) 4 % ophthalmic solution, 1 drop 2 (Two) Times a Day., Disp: , Rfl: 1  •  flecainide (TAMBOCOR) 50 MG tablet, Take 1 tablet by mouth As Needed (Palpitations)., Disp: 60 tablet, Rfl: 1  •  Multiple Vitamin (MULTI VITAMIN DAILY PO), Take by mouth., Disp: , Rfl:   •  doxycycline (MONODOX) 100 MG capsule, Take 1 capsule by mouth 2 (Two) Times a Day As Needed., Disp: , Rfl: 11  •  traZODone (DESYREL) 50 MG tablet, Take 1 tablet by mouth As Needed for Sleep., Disp: 30 tablet, Rfl: 2  Current outpatient and discharge medications have been reconciled for the patient.  Reviewed by: Lonny Terrell MD      Procedures    Lab Results (most recent)     Seng Bedolla was seen today for establish care and melanoma.    Diagnoses and all orders for this visit:    Onychomycosis    Ventricular premature beats    Insomnia, unspecified type    Hyperlipidemia, unspecified hyperlipidemia type      Prescription sent to the compounding pharmacy for toenail fungus treatment cream.  Advised continued use of his other medications as prescribed as well as following up with specialist as indicated.    Return for As needed.      Lonny Terrell MD

## 2019-10-07 ENCOUNTER — PREP FOR SURGERY (OUTPATIENT)
Dept: OTHER | Facility: HOSPITAL | Age: 59
End: 2019-10-07

## 2019-10-07 DIAGNOSIS — Z86.010 HX OF ADENOMATOUS COLONIC POLYPS: Primary | ICD-10-CM

## 2019-10-08 PROBLEM — Z86.010 HX OF ADENOMATOUS COLONIC POLYPS: Status: ACTIVE | Noted: 2019-10-08

## 2019-10-08 PROBLEM — Z86.0101 HX OF ADENOMATOUS COLONIC POLYPS: Status: ACTIVE | Noted: 2019-10-08

## 2019-10-14 ENCOUNTER — HOSPITAL ENCOUNTER (OUTPATIENT)
Facility: HOSPITAL | Age: 59
Setting detail: HOSPITAL OUTPATIENT SURGERY
Discharge: HOME | End: 2019-10-14
Attending: SURGERY | Admitting: SURGERY

## 2019-10-14 ENCOUNTER — ANESTHESIA EVENT (OUTPATIENT)
Dept: GASTROENTEROLOGY | Facility: HOSPITAL | Age: 59
End: 2019-10-14

## 2019-10-14 ENCOUNTER — ANESTHESIA (OUTPATIENT)
Dept: GASTROENTEROLOGY | Facility: HOSPITAL | Age: 59
End: 2019-10-14

## 2019-10-14 VITALS
RESPIRATION RATE: 16 BRPM | WEIGHT: 141.19 LBS | TEMPERATURE: 98.5 F | OXYGEN SATURATION: 100 % | DIASTOLIC BLOOD PRESSURE: 92 MMHG | HEART RATE: 71 BPM | SYSTOLIC BLOOD PRESSURE: 123 MMHG | BODY MASS INDEX: 22.11 KG/M2

## 2019-10-14 DIAGNOSIS — Z86.010 HX OF ADENOMATOUS COLONIC POLYPS: ICD-10-CM

## 2019-10-14 PROCEDURE — 88305 TISSUE EXAM BY PATHOLOGIST: CPT | Performed by: SURGERY

## 2019-10-14 PROCEDURE — 25010000002 PROPOFOL 10 MG/ML EMULSION: Performed by: ANESTHESIOLOGY

## 2019-10-14 RX ORDER — PROPOFOL 10 MG/ML
VIAL (ML) INTRAVENOUS AS NEEDED
Status: DISCONTINUED | OUTPATIENT
Start: 2019-10-14 | End: 2019-10-14 | Stop reason: SURG

## 2019-10-14 RX ORDER — SODIUM CHLORIDE, SODIUM LACTATE, POTASSIUM CHLORIDE, CALCIUM CHLORIDE 600; 310; 30; 20 MG/100ML; MG/100ML; MG/100ML; MG/100ML
1000 INJECTION, SOLUTION INTRAVENOUS CONTINUOUS
Status: DISCONTINUED | OUTPATIENT
Start: 2019-10-14 | End: 2019-10-14 | Stop reason: HOSPADM

## 2019-10-14 RX ORDER — LIDOCAINE HYDROCHLORIDE 20 MG/ML
INJECTION, SOLUTION INFILTRATION; PERINEURAL AS NEEDED
Status: DISCONTINUED | OUTPATIENT
Start: 2019-10-14 | End: 2019-10-14 | Stop reason: SURG

## 2019-10-14 RX ORDER — PROPOFOL 10 MG/ML
VIAL (ML) INTRAVENOUS CONTINUOUS PRN
Status: DISCONTINUED | OUTPATIENT
Start: 2019-10-14 | End: 2019-10-14 | Stop reason: SURG

## 2019-10-14 RX ADMIN — SODIUM CHLORIDE, POTASSIUM CHLORIDE, SODIUM LACTATE AND CALCIUM CHLORIDE 1000 ML: 600; 310; 30; 20 INJECTION, SOLUTION INTRAVENOUS at 07:40

## 2019-10-14 RX ADMIN — PROPOFOL 140 MCG/KG/MIN: 10 INJECTION, EMULSION INTRAVENOUS at 08:05

## 2019-10-14 RX ADMIN — LIDOCAINE HYDROCHLORIDE 50 MG: 20 INJECTION, SOLUTION INFILTRATION; PERINEURAL at 08:05

## 2019-10-14 RX ADMIN — PROPOFOL 125 MG: 10 INJECTION, EMULSION INTRAVENOUS at 08:05

## 2019-10-14 NOTE — ANESTHESIA POSTPROCEDURE EVALUATION
Patient: Graeme Lopez    Procedure Summary     Date:  10/14/19 Room / Location:   [facility]    Anesthesia Start:  0802 Anesthesia Stop:  0826    Procedure:  COLONOSCOPY TO CECUM WITH COLD BIOPSIES AND COLD SNARE POLYPECTOMY (N/A ) Diagnosis:       Hx of adenomatous colonic polyps      (Hx of adenomatous colonic polyps [Z86.010])    Surgeon:  Flavio Urena MD Provider:  Parmjit Bonilla MD    Anesthesia Type:  MAC ASA Status:  1          Anesthesia Type: MAC  Last vitals  BP   124/91 (10/14/19 0839)   Temp   36.9 °C (98.5 °F) (10/14/19 0729)   Pulse   69 (10/14/19 0839)   Resp   16 (10/14/19 0839)     SpO2   100 % (10/14/19 0839)     Post Anesthesia Care and Evaluation    Patient location during evaluation: bedside  Patient participation: complete - patient participated  Level of consciousness: awake and alert  Pain score: 0  Pain management: adequate  Airway patency: patent  Anesthetic complications: No anesthetic complications  PONV Status: none  Cardiovascular status: acceptable  Respiratory status: acceptable  Hydration status: acceptable  Post Neuraxial Block status: Motor and sensory function returned to baseline

## 2019-10-14 NOTE — H&P
Graeme Lopez is a 59 y.o. male who presents today for a Colonoscopy.  He has a personal history of colon polyps and was last checked 5 years ago.  He is due for a surveillance colonoscopy.  He has no GI complaints or problems.    Past Medical History:   Diagnosis Date   • Acute URI    • Adhesive capsulitis of shoulder     left shoulder pain   • Allergic 1995    Sulfa   • Chronic prostatitis    • Colon polyp 2015    Removed during colonoscopy   • Eosinophilic esophagitis     Noted on EGD biopsy February 11, 2016 by Dr. Chambers   • Esophageal stricture     Dilated September 2016   • Eustachian tube dysfunction    • GERD (gastroesophageal reflux disease)    • Hypercholesteremia    • Low back pain    • Lumbar radiculopathy    • Lumbar spondylosis    • Palpitations    • Premature ventricular contraction    • Skin cancer    • Tubular adenoma of colon     Colonoscopy 2014-Normal         Objective     Pt is in no distress.  Heart regular.  Chest clear.  Abdomen soft.  Rectal deferred to endoscopy.      Assessment/Plan      Plan a Colonoscopy today.  Risks and benefits were discussed.  Patient is agreeable.  Final recommendations will follow depending on the results.    Flavio Urena M.D.

## 2019-10-15 LAB
LAB AP CASE REPORT: NORMAL
PATH REPORT.FINAL DX SPEC: NORMAL
PATH REPORT.GROSS SPEC: NORMAL

## 2020-02-04 ENCOUNTER — OFFICE VISIT (OUTPATIENT)
Dept: FAMILY MEDICINE CLINIC | Facility: CLINIC | Age: 60
End: 2020-02-04

## 2020-02-04 VITALS
SYSTOLIC BLOOD PRESSURE: 118 MMHG | RESPIRATION RATE: 14 BRPM | OXYGEN SATURATION: 98 % | HEART RATE: 81 BPM | BODY MASS INDEX: 22.91 KG/M2 | WEIGHT: 146 LBS | HEIGHT: 67 IN | DIASTOLIC BLOOD PRESSURE: 62 MMHG

## 2020-02-04 DIAGNOSIS — Z12.5 SCREENING FOR PROSTATE CANCER: ICD-10-CM

## 2020-02-04 DIAGNOSIS — Z00.00 ENCOUNTER FOR HEALTH MAINTENANCE EXAMINATION: Primary | ICD-10-CM

## 2020-02-04 DIAGNOSIS — Z13.29 SCREENING FOR THYROID DISORDER: ICD-10-CM

## 2020-02-04 DIAGNOSIS — Z13.220 SCREENING FOR LIPID DISORDERS: ICD-10-CM

## 2020-02-04 DIAGNOSIS — G47.00 INSOMNIA, UNSPECIFIED TYPE: ICD-10-CM

## 2020-02-04 DIAGNOSIS — Z13.1 SCREENING FOR DIABETES MELLITUS: ICD-10-CM

## 2020-02-04 PROCEDURE — 99396 PREV VISIT EST AGE 40-64: CPT | Performed by: FAMILY MEDICINE

## 2020-02-04 PROCEDURE — 99214 OFFICE O/P EST MOD 30 MIN: CPT | Performed by: FAMILY MEDICINE

## 2020-02-04 RX ORDER — ZOLPIDEM TARTRATE 10 MG/1
10 TABLET ORAL NIGHTLY PRN
Qty: 30 TABLET | Refills: 0 | Status: SHIPPED | OUTPATIENT
Start: 2020-02-04 | End: 2020-11-20

## 2020-02-04 RX ORDER — MELOXICAM 15 MG/1
15 TABLET ORAL DAILY PRN
COMMUNITY
Start: 2020-01-10

## 2020-02-04 NOTE — PROGRESS NOTES
Graeme Lopez is a 59 y.o. male.     Chief Complaint   Patient presents with   • Annual Exam     patient needs physical exam       HPI     Patient is here for health maintenance exam as well as to discuss a problem is new to me.  Patient states that he has issues with insomnia when he is traveling and staying in hotel rooms.  Has a difficult time sleeping at night.  Would like something to assist him.  Has tried temazepam and trazodone in the past with moderate results.  Tries to stay active physically.  No changes in family history of illness.  No tobacco use.  Up-to-date on colonoscopies.    The following portions of the patient's history were reviewed and updated as appropriate: allergies, current medications, past family history, past medical history, past social history, past surgical history and problem list.    Review of Systems   Constitutional: Negative for activity change.   Allergic/Immunologic: Negative for environmental allergies.   All other systems reviewed and are negative.    I have reviewed the ROS as documented by the MA. Lonny Terrell MD    Objective  Vitals:    02/04/20 1242   BP: 118/62   Pulse: 81   Resp: 14   SpO2: 98%     Body mass index is 22.86 kg/m².    Physical Exam   Constitutional: He is oriented to person, place, and time. He appears well-developed and well-nourished. No distress.   HENT:   Head: Normocephalic and atraumatic.   Right Ear: External ear normal.   Left Ear: External ear normal.   Nose: Nose normal.   Mouth/Throat: Oropharynx is clear and moist.   Eyes: Pupils are equal, round, and reactive to light. Conjunctivae and EOM are normal. Right eye exhibits no discharge. Left eye exhibits no discharge. No scleral icterus.   Neck: Normal range of motion. Neck supple.   Cardiovascular: Normal rate, regular rhythm and normal heart sounds. Exam reveals no friction rub.   No murmur heard.  Pulmonary/Chest: Effort normal and breath sounds normal. No respiratory distress. He  has no wheezes. He has no rales.   Abdominal: Soft. Bowel sounds are normal. He exhibits no distension. There is no tenderness. There is no rebound and no guarding.   Lymphadenopathy:     He has no cervical adenopathy.   Neurological: He is alert and oriented to person, place, and time.   Skin: Skin is warm and dry. He is not diaphoretic.   Nursing note and vitals reviewed.        Current Outpatient Medications:   •  Calcium Citrate-Vitamin D (CALCIUM + D PO), Take  by mouth., Disp: , Rfl:   •  Multiple Vitamin (MULTI VITAMIN DAILY PO), Take by mouth., Disp: , Rfl:   •  cromolyn (OPTICROM) 4 % ophthalmic solution, 1 drop 2 (Two) Times a Day., Disp: , Rfl: 1  •  doxycycline (MONODOX) 100 MG capsule, Take 1 capsule by mouth 2 (Two) Times a Day As Needed., Disp: , Rfl: 11  •  flecainide (TAMBOCOR) 50 MG tablet, Take 1 tablet by mouth As Needed (Palpitations)., Disp: 60 tablet, Rfl: 1  •  meloxicam (MOBIC) 15 MG tablet, , Disp: , Rfl:   •  traZODone (DESYREL) 50 MG tablet, Take 1 tablet by mouth As Needed for Sleep., Disp: 30 tablet, Rfl: 2  •  zolpidem (AMBIEN) 10 MG tablet, Take 1 tablet by mouth At Night As Needed for Sleep., Disp: 30 tablet, Rfl: 0  Current outpatient and discharge medications have been reconciled for the patient.  Reviewed by: Lonny Terrell MD      Procedures    Lab Results (most recent)     None                  Graeme was seen today for annual exam.    Diagnoses and all orders for this visit:    Encounter for health maintenance examination    Screening for lipid disorders  -     Lipid Panel    Screening for thyroid disorder  -     Thyroid Panel With TSH    Screening for diabetes mellitus  -     Comprehensive Metabolic Panel  -     Hemoglobin A1c    Screening for prostate cancer  -     PSA Screen    Insomnia, unspecified type  -     zolpidem (AMBIEN) 10 MG tablet; Take 1 tablet by mouth At Night As Needed for Sleep.      Preventative health maintenance and screening as above.  Discussed  treatment options for insomnia.  Will give prescription for 30 tablets of Ambien.  Advised to start out taking half a tablet as needed.  Advised continued use of diet and exercise.    Return for As needed.      Lonny Terrell MD

## 2020-02-05 LAB
ALBUMIN SERPL-MCNC: 4.4 G/DL (ref 3.5–5.2)
ALBUMIN/GLOB SERPL: 2.2 G/DL
ALP SERPL-CCNC: 97 U/L (ref 39–117)
ALT SERPL-CCNC: 15 U/L (ref 1–41)
AST SERPL-CCNC: 21 U/L (ref 1–40)
BILIRUB SERPL-MCNC: 0.3 MG/DL (ref 0.2–1.2)
BUN SERPL-MCNC: 20 MG/DL (ref 6–20)
BUN/CREAT SERPL: 18.3 (ref 7–25)
CALCIUM SERPL-MCNC: 9.2 MG/DL (ref 8.6–10.5)
CHLORIDE SERPL-SCNC: 102 MMOL/L (ref 98–107)
CHOLEST SERPL-MCNC: 189 MG/DL (ref 0–200)
CO2 SERPL-SCNC: 25.5 MMOL/L (ref 22–29)
CREAT SERPL-MCNC: 1.09 MG/DL (ref 0.76–1.27)
FT4I SERPL CALC-MCNC: 1.9 (ref 1.2–4.9)
GLOBULIN SER CALC-MCNC: 2 GM/DL
GLUCOSE SERPL-MCNC: 66 MG/DL (ref 65–99)
HBA1C MFR BLD: 5.7 % (ref 4.8–5.6)
HDLC SERPL-MCNC: 63 MG/DL (ref 40–60)
LDLC SERPL CALC-MCNC: 104 MG/DL (ref 0–100)
POTASSIUM SERPL-SCNC: 4.7 MMOL/L (ref 3.5–5.2)
PROT SERPL-MCNC: 6.4 G/DL (ref 6–8.5)
PSA SERPL-MCNC: 0.89 NG/ML (ref 0–4)
SODIUM SERPL-SCNC: 140 MMOL/L (ref 136–145)
T3RU NFR SERPL: 27 % (ref 24–39)
T4 SERPL-MCNC: 7.1 UG/DL (ref 4.5–12)
TRIGL SERPL-MCNC: 111 MG/DL (ref 0–150)
TSH SERPL DL<=0.005 MIU/L-ACNC: 1.01 UIU/ML (ref 0.45–4.5)
VLDLC SERPL CALC-MCNC: 22.2 MG/DL

## 2020-11-20 ENCOUNTER — OFFICE VISIT (OUTPATIENT)
Dept: FAMILY MEDICINE CLINIC | Facility: CLINIC | Age: 60
End: 2020-11-20

## 2020-11-20 VITALS
HEIGHT: 67 IN | HEART RATE: 82 BPM | BODY MASS INDEX: 22.07 KG/M2 | WEIGHT: 140.6 LBS | DIASTOLIC BLOOD PRESSURE: 66 MMHG | SYSTOLIC BLOOD PRESSURE: 118 MMHG | OXYGEN SATURATION: 99 % | TEMPERATURE: 97.1 F

## 2020-11-20 DIAGNOSIS — Z91.018 MULTIPLE FOOD ALLERGIES: Primary | ICD-10-CM

## 2020-11-20 DIAGNOSIS — G47.09 OTHER INSOMNIA: ICD-10-CM

## 2020-11-20 PROBLEM — K20.0 EOSINOPHILIC ESOPHAGITIS: Status: ACTIVE | Noted: 2020-11-20

## 2020-11-20 PROCEDURE — 99214 OFFICE O/P EST MOD 30 MIN: CPT | Performed by: FAMILY MEDICINE

## 2020-11-20 RX ORDER — EPINEPHRINE 0.3 MG/.3ML
0.3 INJECTION SUBCUTANEOUS ONCE AS NEEDED
Qty: 1 EACH | Refills: 0 | Status: SHIPPED | OUTPATIENT
Start: 2020-11-20

## 2020-11-20 RX ORDER — LOTEPREDNOL ETABONATE 3.8 MG/G
GEL OPHTHALMIC
COMMUNITY
Start: 2020-09-21 | End: 2020-12-28

## 2020-11-20 RX ORDER — TEMAZEPAM 15 MG/1
15 CAPSULE ORAL NIGHTLY PRN
Qty: 30 CAPSULE | Refills: 0 | Status: SHIPPED | OUTPATIENT
Start: 2020-11-20 | End: 2022-04-08 | Stop reason: SDUPTHER

## 2020-11-20 NOTE — PROGRESS NOTES
Graeme Lopez is a 60 y.o. male.     Chief Complaint   Patient presents with   • Establish Care     pt is here to establish care   • Allergies     pt is requestign food allergy testing     Masks/face shield/appropriate PPE were worn for the entirety of the visit by the patient, MA, and provider.     HPI     Pt is a pleasant 60 y.o. YO male here for desire to be tested for food allergies.  He is a new patient to me and is here today to establish care.    Food allergies - patient has noticed in reaction to blueberries as well as some different nuts/seeds and types of bear that he will experience throat tightness, has gotten so bad he has had difficulty swallow and has almost gone to the ER. Has never had formal allergy testing. Usually when it happens tries to take benadryl to help, does not have a epi pen.     Insomnia - chronic, worse when he travels. At home he is typically able to fall asleep okay but then often wakes up during the night unable to fall back asleep.  He does not take anything on a regular basis for sleep.  He does have prescriptions both for Ambien as well as trazodone.  He states that the trazodone makes him feel dizzy.  He has taken the Ambien a couple times but has not noticed that it has worked very much.  Had a past prescription for temazepam which he remembers working very well.  Has only taken 3 or 4 Ambien in the last 9 months.      History of eosinophilic esophagitis, has seen Dr. Trish MARTINEZ in the past for EGD.  Told that he has a very narrow esophagus, they tried dilating it but it was unsuccessful.    The following portions of the patient's history were reviewed by me and updated as appropriate: allergies, current medications, past family history, past medical history, past social history, past surgical history, and problem list.     Review of Systems   Constitutional: Negative.    HENT: Negative.    Eyes: Negative.    Respiratory: Negative.    Cardiovascular: Negative.   Negative for chest pain, palpitations and leg swelling.   Gastrointestinal: Negative.    Endocrine: Negative.    Genitourinary: Negative.    Musculoskeletal: Negative.    Skin: Negative.    Allergic/Immunologic: Positive for food allergies.   Neurological: Negative.    Hematological: Negative.    Psychiatric/Behavioral: Negative.    I have reviewed and confirmed the accuracy of the ROS as documented by the MA/LPN/RN Zahida Wilburn MD    Objective  Vitals:    11/20/20 0820   BP: 118/66   Pulse: 82   Temp: 97.1 °F (36.2 °C)   SpO2: 99%     Body mass index is 22.02 kg/m².       Physical Exam  Vitals signs reviewed.   Constitutional:       General: He is not in acute distress.     Appearance: He is well-developed.   HENT:      Head: Normocephalic and atraumatic.   Eyes:      General: No scleral icterus.        Right eye: No discharge.         Left eye: No discharge.      Conjunctiva/sclera: Conjunctivae normal.   Pulmonary:      Effort: Pulmonary effort is normal. No respiratory distress.   Skin:     Coloration: Skin is not pale.      Findings: No erythema.   Neurological:      Mental Status: He is alert and oriented to person, place, and time.   Psychiatric:         Behavior: Behavior normal.         Thought Content: Thought content normal.         Judgment: Judgment normal.           Current Outpatient Medications:   •  Calcium Citrate-Vitamin D (CALCIUM + D PO), Take  by mouth., Disp: , Rfl:   •  flecainide (TAMBOCOR) 50 MG tablet, Take 1 tablet by mouth As Needed (Palpitations)., Disp: 60 tablet, Rfl: 1  •  Lotemax SM 0.38 % gel, 1 DROP INTO BOTH EYES TWICE DAILY X 2 WEEKS, Disp: , Rfl:   •  meloxicam (MOBIC) 15 MG tablet, , Disp: , Rfl:   •  Multiple Vitamin (MULTI VITAMIN DAILY PO), Take by mouth., Disp: , Rfl:   •  doxycycline (MONODOX) 100 MG capsule, Take 1 capsule by mouth 2 (Two) Times a Day As Needed., Disp: , Rfl: 11  •  EPINEPHrine (EpiPen 2-Yogi) 0.3 MG/0.3ML solution auto-injector injection, Inject  0.3 mL into the appropriate muscle as directed by prescriber 1 (One) Time As Needed (allergic reaction/anaphylaxis) for up to 1 dose., Disp: 1 each, Rfl: 0  •  temazepam (RESTORIL) 15 MG capsule, Take 1 capsule by mouth At Night As Needed for Sleep., Disp: 30 capsule, Rfl: 0    Procedures    Lab Results (most recent)     None              Diagnoses and all orders for this visit:    1. Multiple food allergies (Primary)  Recommended patient have formal allergy testing so that he can avoid possible triggers.  Also recommended that he have a EpiPen to use as needed given his severe symptoms of reaction.  Prescription sent in for this.  -     EPINEPHrine (EpiPen 2-Yogi) 0.3 MG/0.3ML solution auto-injector injection; Inject 0.3 mL into the appropriate muscle as directed by prescriber 1 (One) Time As Needed (allergic reaction/anaphylaxis) for up to 1 dose.  Dispense: 1 each; Refill: 0  -     Ambulatory Referral to Allergy    2. Other insomnia  Chronic insomnia, worse when traveling then at home but still has at home.  Ambien is not helpful and he has negative reaction to trazodone.  He only uses something on a very sparing basis.  He has used temazepam in the past with he believes good relief.  Will send in prescription for this for him to take as needed.  Discussed the risk of developing dependence with regular use and encouraged him to continue to use it sparingly only.  Jose reviewed and appropriate.  Controlled substance agreement signed.  -     temazepam (RESTORIL) 15 MG capsule; Take 1 capsule by mouth At Night As Needed for Sleep.  Dispense: 30 capsule; Refill: 0            Return in about 4 months (around 3/20/2021) for Annual physical.      Zahida Wilburn MD

## 2020-12-07 ENCOUNTER — OFFICE VISIT (OUTPATIENT)
Dept: CARDIOLOGY | Facility: CLINIC | Age: 60
End: 2020-12-07

## 2020-12-07 VITALS
HEART RATE: 62 BPM | WEIGHT: 143 LBS | DIASTOLIC BLOOD PRESSURE: 88 MMHG | BODY MASS INDEX: 22.44 KG/M2 | HEIGHT: 67 IN | SYSTOLIC BLOOD PRESSURE: 130 MMHG

## 2020-12-07 DIAGNOSIS — I49.3 VENTRICULAR PREMATURE BEATS: Primary | ICD-10-CM

## 2020-12-07 PROCEDURE — 99213 OFFICE O/P EST LOW 20 MIN: CPT | Performed by: INTERNAL MEDICINE

## 2020-12-07 PROCEDURE — 93000 ELECTROCARDIOGRAM COMPLETE: CPT | Performed by: INTERNAL MEDICINE

## 2020-12-07 NOTE — PROGRESS NOTES
Date of Office Visit: 2020  Encounter Provider: Parmjit Rosales MD  Place of Service: [facility] CARDIOLOGY  Patient Name: Graeme Lopez  : 1960    Subjective:     Encounter Date:2020      Patient ID: Graeme Lopez is a 60 y.o. male who has a cc of  PVCs and here is here fu.     He has really rare  Palp and he takes a flecainide  --      The patient had a good year.   No anginal chest pain,   No sig lagunas,   No soa,   No fainting,  No orthostasis.   No edema.   Exercise tolerance: great -- he is very active.     There have been no hospital admission since the last visit.     There have been no bleeding events.       Past Medical History:   Diagnosis Date   • Acute URI    • Adhesive capsulitis of shoulder     left shoulder pain   • Allergic 1995    Sulfa   • Chronic prostatitis    • Colon polyp     Removed during colonoscopy   • Eosinophilic esophagitis     Noted on EGD biopsy 2016 by Dr. Chambers   • Esophageal stricture     Dilated 2016   • Eustachian tube dysfunction    • GERD (gastroesophageal reflux disease)    • Hypercholesteremia    • Low back pain    • Lumbar radiculopathy    • Lumbar spondylosis    • Palpitations    • Premature ventricular contraction    • Skin cancer    • Tubular adenoma of colon     Colonoscopy -Normal       Social History     Socioeconomic History   • Marital status:      Spouse name: Not on file   • Number of children: 1   • Years of education: Not on file   • Highest education level: Not on file   Tobacco Use   • Smoking status: Never Smoker   • Smokeless tobacco: Never Used   Substance and Sexual Activity   • Alcohol use: Yes     Types: 2 Glasses of wine per week     Comment: Socially   • Drug use: No   • Sexual activity: Yes     Partners: Female     Birth control/protection: Post-menopausal   Lifestyle   • Physical activity     Days per week: 5 days     Minutes per session: 30 min   • Stress:  "Not on file       Family History   Problem Relation Age of Onset   • Atrial fibrillation Mother    • Hyperlipidemia Mother    • Hypertension Mother    • Cancer Mother         Pancreatic   • Hearing loss Mother    • Thyroid disease Mother    • Heart disease Mother         Atrial fibrillation   • Heart failure Father    • Coronary artery disease Father         Four or more arterial bypass grafts   • Cancer Father         Skin   • Heart disease Father         Coronary artery disease   • Hyperlipidemia Father    • Ovarian cancer Maternal Grandmother    • Alcohol abuse Maternal Grandfather        Review of Systems   Constitution: Negative for fever and night sweats.   HENT: Negative for ear pain and stridor.    Eyes: Negative for discharge and visual halos.   Cardiovascular: Negative for cyanosis.   Respiratory: Negative for hemoptysis and sputum production.    Hematologic/Lymphatic: Negative for adenopathy.   Skin: Negative for nail changes and unusual hair distribution.   Musculoskeletal: Negative for gout and joint swelling.   Gastrointestinal: Negative for bowel incontinence and flatus.   Genitourinary: Negative for dysuria and flank pain.   Neurological: Negative for seizures and tremors.   Psychiatric/Behavioral: Negative for altered mental status. The patient is not nervous/anxious.             Objective:     Vitals:    12/07/20 1047   BP: 130/88   BP Location: Right arm   Patient Position: Sitting   Cuff Size: Adult   Pulse: 62   Weight: 64.9 kg (143 lb)   Height: 170.2 cm (67\")         Eyes:      General:         Right eye: No discharge.         Left eye: No discharge.   HENT:      Head: Normocephalic and atraumatic.   Neck:      Thyroid: No thyromegaly.      Vascular: No JVD.   Pulmonary:      Effort: Pulmonary effort is normal.      Breath sounds: Normal breath sounds. No rales.   Cardiovascular:      Normal rate. Regular rhythm.      No gallop.   Edema:     Peripheral edema absent.   Abdominal:      General: " Bowel sounds are normal.      Palpations: Abdomen is soft.      Tenderness: There is no abdominal tenderness.   Musculoskeletal: Normal range of motion.         General: No deformity.   Skin:     General: Skin is warm and dry.      Findings: No erythema.   Neurological:      Mental Status: Alert and oriented to person, place, and time.      Motor: Normal muscle tone.   Psychiatric:         Behavior: Behavior normal.         Thought Content: Thought content normal.           ECG 12 Lead    Date/Time: 12/7/2020 11:04 AM  Performed by: Parmjit Rosales MD  Authorized by: Parmjit Rosales MD   Comparison: compared with previous ECG   Similar to previous ECG  Rhythm: sinus rhythm  Rate: normal  Conduction: conduction normal  ST Segments: ST segments normal  T Waves: T waves normal  QRS axis: normal    Clinical impression: normal ECG            Lab Review:       Assessment:          Diagnosis Plan   1. Ventricular premature beats            Plan:     No symptoms --     Exam and ECG normal     No issues really

## 2020-12-28 ENCOUNTER — OFFICE VISIT (OUTPATIENT)
Dept: GASTROENTEROLOGY | Facility: CLINIC | Age: 60
End: 2020-12-28

## 2020-12-28 VITALS — TEMPERATURE: 98 F | HEIGHT: 67 IN | WEIGHT: 147.6 LBS | BODY MASS INDEX: 23.17 KG/M2

## 2020-12-28 DIAGNOSIS — D12.6 ADENOMATOUS POLYP OF COLON, UNSPECIFIED PART OF COLON: ICD-10-CM

## 2020-12-28 DIAGNOSIS — K21.9 GASTROESOPHAGEAL REFLUX DISEASE, UNSPECIFIED WHETHER ESOPHAGITIS PRESENT: Primary | ICD-10-CM

## 2020-12-28 PROCEDURE — 99203 OFFICE O/P NEW LOW 30 MIN: CPT | Performed by: INTERNAL MEDICINE

## 2020-12-28 RX ORDER — OMEPRAZOLE 40 MG/1
40 CAPSULE, DELAYED RELEASE ORAL DAILY PRN
COMMUNITY

## 2020-12-28 RX ORDER — TOBRAMYCIN AND DEXAMETHASONE 3; 1 MG/ML; MG/ML
SUSPENSION/ DROPS OPHTHALMIC
COMMUNITY
Start: 2020-12-16 | End: 2021-04-07

## 2020-12-28 NOTE — PROGRESS NOTES
Chief Complaint   Patient presents with   • Food Intolerance   • Difficulty Swallowing        Graeme Lopez is a  60 y.o. male here for an initial visit for dysphagia, history of polyps    HPI this 60-year-old white male patient of Dr. Zahida Pride presents with a history of swallowing difficulty and concern with possible laryngeal pharyngeal reflux.  He had been seen by an allergist and given this diagnosis as well as initiation of a proton pump inhibitor.  He had actually undergone previous upper endoscopy through this office in September 2016 for swallowing difficulty and known history of esophageal stenosis.  At that time he had narrowing in the distal esophagus that warranted dilation up to 15 mm.  Biopsies obtained at that time showed 20 eosinophils per high-powered field with a consideration of possible eosinophilic esophagitis.  He was treated with Nexium at that time for a period of 3 months and then stopped this medication.  We talked about further evaluation to see whether he has had recurrence of esophageal stricture or if there is further evidence of eosinophilic esophagitis.  He wishes to defer that for the present.  He does have a history of adenomatous polyps last studied by Dr. Park in 2019 with an adenomatous polyp removed in the proximal transverse colon.  He was advised to follow-up in 3 to 5 years.  He will call this office as needed for any further swallowing difficulty.    Past Medical History:   Diagnosis Date   • Acute URI    • Adhesive capsulitis of shoulder     left shoulder pain   • Allergic 1995    Sulfa   • Chronic prostatitis    • Colon polyp 2015    Removed during colonoscopy   • Eosinophilic esophagitis     Noted on EGD biopsy February 11, 2016 by Dr. Chambers   • Esophageal stricture     Dilated September 2016   • Eustachian tube dysfunction    • GERD (gastroesophageal reflux disease)    • Hypercholesteremia    • Low back pain    • Lumbar radiculopathy    • Lumbar  spondylosis    • Palpitations    • Premature ventricular contraction    • Skin cancer    • Tubular adenoma of colon     Colonoscopy 2014-Normal       Current Outpatient Medications   Medication Sig Dispense Refill   • Calcium Citrate-Vitamin D (CALCIUM + D PO) Take  by mouth.     • doxycycline (MONODOX) 100 MG capsule Take 1 capsule by mouth 2 (Two) Times a Day As Needed.  11   • EPINEPHrine (EpiPen 2-Yogi) 0.3 MG/0.3ML solution auto-injector injection Inject 0.3 mL into the appropriate muscle as directed by prescriber 1 (One) Time As Needed (allergic reaction/anaphylaxis) for up to 1 dose. 1 each 0   • Multiple Vitamin (MULTI VITAMIN DAILY PO) Take by mouth.     • omeprazole (priLOSEC) 40 MG capsule Take 40 mg by mouth Daily.     • temazepam (RESTORIL) 15 MG capsule Take 1 capsule by mouth At Night As Needed for Sleep. 30 capsule 0   • tobramycin-dexamethasone (TOBRADEX) 0.3-0.1 % ophthalmic suspension LOCATION  RIGHT EYE. INSTILL 1 DROP INTO RIGHT EYE THREE TIMES A DAY     • meloxicam (MOBIC) 15 MG tablet        No current facility-administered medications for this visit.        PRN Meds:.    Allergies   Allergen Reactions   • Sulfa Antibiotics Rash       Social History     Socioeconomic History   • Marital status:      Spouse name: Not on file   • Number of children: 1   • Years of education: Not on file   • Highest education level: Not on file   Tobacco Use   • Smoking status: Never Smoker   • Smokeless tobacco: Never Used   Substance and Sexual Activity   • Alcohol use: Yes     Types: 2 Glasses of wine per week     Comment: Socially   • Drug use: No   • Sexual activity: Yes     Partners: Female     Birth control/protection: Post-menopausal   Lifestyle   • Physical activity     Days per week: 5 days     Minutes per session: 30 min   • Stress: Not on file       Family History   Problem Relation Age of Onset   • Atrial fibrillation Mother    • Hyperlipidemia Mother    • Hypertension Mother    • Cancer Mother          Pancreatic   • Hearing loss Mother    • Thyroid disease Mother    • Heart disease Mother         Atrial fibrillation   • Heart failure Father    • Coronary artery disease Father         Four or more arterial bypass grafts   • Cancer Father         Skin   • Heart disease Father         Coronary artery disease   • Hyperlipidemia Father    • Ovarian cancer Maternal Grandmother    • Alcohol abuse Maternal Grandfather        Review of Systems   Constitutional: Negative for activity change, appetite change, fatigue and unexpected weight change.   HENT: Negative for congestion, facial swelling, sore throat, trouble swallowing and voice change.    Eyes: Negative for photophobia and visual disturbance.   Respiratory: Negative for cough and choking.    Cardiovascular: Negative for chest pain.   Gastrointestinal: Negative for abdominal distention, abdominal pain, anal bleeding, blood in stool, constipation, diarrhea, nausea, rectal pain and vomiting.        Dysphagia   Endocrine: Negative for polyphagia.   Musculoskeletal: Negative for arthralgias, gait problem and joint swelling.   Skin: Negative for color change, pallor and rash.   Allergic/Immunologic: Negative for food allergies.   Neurological: Negative for speech difficulty and headaches.   Hematological: Does not bruise/bleed easily.   Psychiatric/Behavioral: Negative for agitation, confusion and sleep disturbance.       Vitals:    12/28/20 1419   Temp: 98 °F (36.7 °C)       Physical Exam  Constitutional:       Appearance: He is well-developed.   HENT:      Head: Normocephalic.   Eyes:      Conjunctiva/sclera: Conjunctivae normal.   Neck:      Musculoskeletal: Normal range of motion.   Cardiovascular:      Rate and Rhythm: Normal rate and regular rhythm.   Pulmonary:      Breath sounds: Normal breath sounds.   Abdominal:      General: Bowel sounds are normal.      Palpations: Abdomen is soft.   Musculoskeletal: Normal range of motion.   Skin:     General: Skin  is warm and dry.   Neurological:      Mental Status: He is alert and oriented to person, place, and time.   Psychiatric:         Behavior: Behavior normal.         ASSESSMENT  #1 dysphagia: Possible stricture, possible eosinophilic esophagitis.  #2 GERD  #3 history of polyps      PLAN  Patient to call as needed for any further GI related issues.      ICD-10-CM ICD-9-CM   1. Gastroesophageal reflux disease, unspecified whether esophagitis present  K21.9 530.81   2. Adenomatous polyp of colon, unspecified part of colon  D12.6 211.3

## 2021-03-30 ENCOUNTER — BULK ORDERING (OUTPATIENT)
Dept: CASE MANAGEMENT | Facility: OTHER | Age: 61
End: 2021-03-30

## 2021-03-30 DIAGNOSIS — Z23 IMMUNIZATION DUE: ICD-10-CM

## 2021-04-07 ENCOUNTER — OFFICE VISIT (OUTPATIENT)
Dept: FAMILY MEDICINE CLINIC | Facility: CLINIC | Age: 61
End: 2021-04-07

## 2021-04-07 VITALS
TEMPERATURE: 98.2 F | WEIGHT: 147 LBS | HEIGHT: 67 IN | DIASTOLIC BLOOD PRESSURE: 70 MMHG | SYSTOLIC BLOOD PRESSURE: 124 MMHG | BODY MASS INDEX: 23.07 KG/M2 | OXYGEN SATURATION: 99 % | HEART RATE: 77 BPM

## 2021-04-07 DIAGNOSIS — Z00.00 ENCOUNTER FOR HEALTH MAINTENANCE EXAMINATION IN ADULT: Primary | ICD-10-CM

## 2021-04-07 DIAGNOSIS — Z13.0 SCREENING FOR DEFICIENCY ANEMIA: ICD-10-CM

## 2021-04-07 DIAGNOSIS — Z13.1 SCREENING FOR DIABETES MELLITUS: ICD-10-CM

## 2021-04-07 DIAGNOSIS — Z13.220 SCREENING FOR HYPERLIPIDEMIA: ICD-10-CM

## 2021-04-07 PROCEDURE — 99396 PREV VISIT EST AGE 40-64: CPT | Performed by: FAMILY MEDICINE

## 2021-04-07 RX ORDER — LORATADINE 10 MG/1
CAPSULE, LIQUID FILLED ORAL DAILY PRN
COMMUNITY
End: 2022-11-28

## 2021-04-07 NOTE — PROGRESS NOTES
Graeme Lopez is a 61 y.o. male.     Chief Complaint   Patient presents with   • Annual Exam     physical - nonfasting     Masks/face shield/appropriate PPE were worn for the entirety of the visit by the patient, MA, and provider.     MARY ANNE     Pt is a pleasant 61 y.o. YO male here for annual physical exam.    Health Maintenance   Topic Date Due   • ZOSTER VACCINE (1 of 2) Never done   • LIPID PANEL  02/04/2021   • INFLUENZA VACCINE  08/01/2021   • ANNUAL PHYSICAL  04/08/2022   • TDAP/TD VACCINES (2 - Td) 06/17/2025   • COLONOSCOPY  10/14/2029   • COVID-19 Vaccine  Completed   • HEPATITIS C SCREENING  Addressed   • Pneumococcal Vaccine 0-64  Aged Out   • MENINGOCOCCAL VACCINE  Aged Out     Exercise: Plays golf and walks regularly  Dental: Established, up to date with cleanings  Immunizations: Up to date with flu, Tdap, and COVID - 19. Needs Shingles vaccine  Colon Cancer Screening: Up to date, October 2019, needs repeat in 3 year interval - October 2022  Prostate Cancer Screening: Up to date, saw his urologist in the last month - Dr. Sy - normal PSA.    Patient has history of basal cell carcinoma on the face and chest.  Tries to be very diligent with sunscreen which we discussed today.  Typically always applies when he plays golf and also wears a hat and sometimes arm coverings.  Recommended that he continue to use a high-quality sunscreen that is an SPF of 50.    He has also been having some issues with a stye at the lower right eyelid.  It has been there for couple months and he is currently seeing ophthalmology for it.  Is on treatment topical blephadex, doxycycline, and warm compresses. Has not been very diligent with the compresses, encouraged him to continue trying this along with gentle massage of the are.     The following portions of the patient's history were reviewed by me and updated as appropriate: allergies, current medications, past family history, past medical history, past social history,  past surgical history, and problem list.     Review of Systems   Constitutional: Negative.    HENT: Negative.    Eyes: Negative.    Respiratory: Negative.    Cardiovascular: Negative.    Gastrointestinal: Negative.    Endocrine: Negative.    Genitourinary: Negative.    Musculoskeletal: Negative.    Skin: Negative.    Allergic/Immunologic: Negative.    Neurological: Negative.    Hematological: Negative.    Psychiatric/Behavioral: Negative.    I have reviewed and confirmed the accuracy of the ROS as documented by the MA/LPGUTIERREZ/RN Zahida Wilburn MD    Objective  Vitals:    04/07/21 1326   BP: 124/70   Pulse: 77   Temp: 98.2 °F (36.8 °C)   SpO2: 99%     Body mass index is 23.02 kg/m².       Physical Exam  Vitals reviewed.   Constitutional:       General: He is not in acute distress.     Appearance: He is well-developed.   HENT:      Head: Normocephalic and atraumatic.      Right Ear: Tympanic membrane, ear canal and external ear normal.      Left Ear: Tympanic membrane, ear canal and external ear normal.      Nose: Nose normal.      Mouth/Throat:      Mouth: Mucous membranes are moist.      Pharynx: Oropharynx is clear. No oropharyngeal exudate.   Eyes:      General: No scleral icterus.        Right eye: No discharge.         Left eye: No discharge.      Conjunctiva/sclera: Conjunctivae normal.      Comments: Stye of right lower eyelid   Neck:      Thyroid: No thyromegaly.      Vascular: No carotid bruit.   Cardiovascular:      Rate and Rhythm: Normal rate and regular rhythm.      Heart sounds: No murmur heard.   No friction rub. No gallop.    Pulmonary:      Effort: Pulmonary effort is normal. No respiratory distress.      Breath sounds: Normal breath sounds. No wheezing or rales.   Abdominal:      General: Abdomen is flat. Bowel sounds are normal. There is no distension.      Palpations: Abdomen is soft. There is no mass.      Tenderness: There is no abdominal tenderness.   Musculoskeletal:      Cervical back: Neck  supple.      Right lower leg: No edema.   Lymphadenopathy:      Cervical: No cervical adenopathy.   Skin:     General: Skin is warm and dry.   Neurological:      Mental Status: He is alert and oriented to person, place, and time.   Psychiatric:         Behavior: Behavior normal.         Thought Content: Thought content normal.         Judgment: Judgment normal.           Current Outpatient Medications:   •  Calcium Citrate-Vitamin D (CALCIUM + D PO), Take  by mouth., Disp: , Rfl:   •  doxycycline (MONODOX) 100 MG capsule, Take 1 capsule by mouth 2 (Two) Times a Day As Needed., Disp: , Rfl: 11  •  Loratadine (Claritin) 10 MG capsule, Take  by mouth Daily As Needed., Disp: , Rfl:   •  Multiple Vitamin (MULTI VITAMIN DAILY PO), Take by mouth., Disp: , Rfl:   •  omeprazole (priLOSEC) 40 MG capsule, Take 40 mg by mouth Daily As Needed., Disp: , Rfl:   •  EPINEPHrine (EpiPen 2-Yogi) 0.3 MG/0.3ML solution auto-injector injection, Inject 0.3 mL into the appropriate muscle as directed by prescriber 1 (One) Time As Needed (allergic reaction/anaphylaxis) for up to 1 dose., Disp: 1 each, Rfl: 0  •  meloxicam (MOBIC) 15 MG tablet, , Disp: , Rfl:   •  temazepam (RESTORIL) 15 MG capsule, Take 1 capsule by mouth At Night As Needed for Sleep., Disp: 30 capsule, Rfl: 0    Procedures    Lab Results (most recent)     None              Diagnoses and all orders for this visit:    Encounter for health maintenance examination in adult  Pleasant 61-year-old male here today for routine health maintenance.  Is generally quite healthy, he has normal BMI, normal blood pressure, and is regularly physically active.  He is up-to-date with recommended colon cancer screening and prostate cancer screening.  His immunizations are up-to-date with the exception of the shingles vaccine which we discussed at length.  Recommended he check with his local pharmacy as we are currently out of stock.  He is not fasting today but we will go ahead and do blood  work since he is here, discussed with him it could cause his lipid panel to appear falsely elevated.  Patient was understanding.  -     Hemoglobin A1c  -     Lipid Panel  -     Comprehensive Metabolic Panel  -     CBC & Differential    Screening for hyperlipidemia  -     Lipid Panel    Screening for deficiency anemia  -     CBC & Differential    Screening for diabetes mellitus  -     Hemoglobin A1c        Return in about 1 year (around 4/7/2022) for Annual physical, Fasting labs.      Zahida Wilburn MD

## 2021-04-08 LAB
ALBUMIN SERPL-MCNC: 4.5 G/DL (ref 3.5–5.2)
ALBUMIN/GLOB SERPL: 2.4 G/DL
ALP SERPL-CCNC: 99 U/L (ref 39–117)
ALT SERPL-CCNC: 22 U/L (ref 1–41)
AST SERPL-CCNC: 23 U/L (ref 1–40)
BASOPHILS # BLD AUTO: 0.06 10*3/MM3 (ref 0–0.2)
BASOPHILS NFR BLD AUTO: 0.7 % (ref 0–1.5)
BILIRUB SERPL-MCNC: 0.4 MG/DL (ref 0–1.2)
BUN SERPL-MCNC: 22 MG/DL (ref 6–20)
BUN/CREAT SERPL: 19.5 (ref 7–25)
CALCIUM SERPL-MCNC: 9.5 MG/DL (ref 8.6–10.5)
CHLORIDE SERPL-SCNC: 100 MMOL/L (ref 98–107)
CHOLEST SERPL-MCNC: 198 MG/DL (ref 0–200)
CO2 SERPL-SCNC: 29.2 MMOL/L (ref 22–29)
CREAT SERPL-MCNC: 1.13 MG/DL (ref 0.76–1.27)
EOSINOPHIL # BLD AUTO: 0.53 10*3/MM3 (ref 0–0.4)
EOSINOPHIL NFR BLD AUTO: 6.3 % (ref 0.3–6.2)
ERYTHROCYTE [DISTWIDTH] IN BLOOD BY AUTOMATED COUNT: 12.5 % (ref 12.3–15.4)
GLOBULIN SER CALC-MCNC: 1.9 GM/DL
GLUCOSE SERPL-MCNC: 91 MG/DL (ref 65–99)
HBA1C MFR BLD: 5.4 % (ref 4.8–5.6)
HCT VFR BLD AUTO: 41 % (ref 37.5–51)
HDLC SERPL-MCNC: 73 MG/DL (ref 40–60)
HGB BLD-MCNC: 14.5 G/DL (ref 13–17.7)
IMM GRANULOCYTES # BLD AUTO: 0.02 10*3/MM3 (ref 0–0.05)
IMM GRANULOCYTES NFR BLD AUTO: 0.2 % (ref 0–0.5)
LDLC SERPL CALC-MCNC: 104 MG/DL (ref 0–100)
LYMPHOCYTES # BLD AUTO: 2.58 10*3/MM3 (ref 0.7–3.1)
LYMPHOCYTES NFR BLD AUTO: 30.5 % (ref 19.6–45.3)
MCH RBC QN AUTO: 30.7 PG (ref 26.6–33)
MCHC RBC AUTO-ENTMCNC: 35.4 G/DL (ref 31.5–35.7)
MCV RBC AUTO: 86.9 FL (ref 79–97)
MONOCYTES # BLD AUTO: 0.84 10*3/MM3 (ref 0.1–0.9)
MONOCYTES NFR BLD AUTO: 9.9 % (ref 5–12)
NEUTROPHILS # BLD AUTO: 4.42 10*3/MM3 (ref 1.7–7)
NEUTROPHILS NFR BLD AUTO: 52.4 % (ref 42.7–76)
NRBC BLD AUTO-RTO: 0 /100 WBC (ref 0–0.2)
PLATELET # BLD AUTO: 235 10*3/MM3 (ref 140–450)
POTASSIUM SERPL-SCNC: 4.2 MMOL/L (ref 3.5–5.2)
PROT SERPL-MCNC: 6.4 G/DL (ref 6–8.5)
PSA SERPL-MCNC: 1.18 NG/ML (ref 0–4)
RBC # BLD AUTO: 4.72 10*6/MM3 (ref 4.14–5.8)
SODIUM SERPL-SCNC: 137 MMOL/L (ref 136–145)
TRIGL SERPL-MCNC: 123 MG/DL (ref 0–150)
VLDLC SERPL CALC-MCNC: 21 MG/DL (ref 5–40)
WBC # BLD AUTO: 8.45 10*3/MM3 (ref 3.4–10.8)

## 2022-04-08 ENCOUNTER — OFFICE VISIT (OUTPATIENT)
Dept: FAMILY MEDICINE CLINIC | Facility: CLINIC | Age: 62
End: 2022-04-08

## 2022-04-08 VITALS
HEIGHT: 67 IN | SYSTOLIC BLOOD PRESSURE: 114 MMHG | OXYGEN SATURATION: 98 % | HEART RATE: 71 BPM | BODY MASS INDEX: 22.76 KG/M2 | WEIGHT: 145 LBS | TEMPERATURE: 97.8 F | DIASTOLIC BLOOD PRESSURE: 76 MMHG

## 2022-04-08 DIAGNOSIS — Z13.220 SCREENING FOR HYPERLIPIDEMIA: ICD-10-CM

## 2022-04-08 DIAGNOSIS — Z51.81 THERAPEUTIC DRUG MONITORING: ICD-10-CM

## 2022-04-08 DIAGNOSIS — N41.1 CHRONIC PROSTATITIS: ICD-10-CM

## 2022-04-08 DIAGNOSIS — Z12.5 SCREENING PSA (PROSTATE SPECIFIC ANTIGEN): ICD-10-CM

## 2022-04-08 DIAGNOSIS — K20.0 EOSINOPHILIC ESOPHAGITIS: ICD-10-CM

## 2022-04-08 DIAGNOSIS — Z13.0 SCREENING FOR DEFICIENCY ANEMIA: ICD-10-CM

## 2022-04-08 DIAGNOSIS — Z13.1 SCREENING FOR DIABETES MELLITUS: ICD-10-CM

## 2022-04-08 DIAGNOSIS — G47.09 OTHER INSOMNIA: Primary | ICD-10-CM

## 2022-04-08 PROBLEM — N41.0 ACUTE PROSTATITIS: Status: ACTIVE | Noted: 2022-04-08

## 2022-04-08 PROCEDURE — 99215 OFFICE O/P EST HI 40 MIN: CPT | Performed by: FAMILY MEDICINE

## 2022-04-08 RX ORDER — TEMAZEPAM 15 MG/1
15 CAPSULE ORAL NIGHTLY PRN
Qty: 30 CAPSULE | Refills: 0 | Status: SHIPPED | OUTPATIENT
Start: 2022-04-08 | End: 2022-11-28 | Stop reason: SDUPTHER

## 2022-04-08 NOTE — ASSESSMENT & PLAN NOTE
Well-controlled currently, he does see  for this.  Meloxicam used episodically when he has flares and doxycycline.  No symptoms currently.

## 2022-04-08 NOTE — PROGRESS NOTES
"Chief Complaint  Insomnia (Follow up sleeping medications  ,no complains the only issue is sleeping problems ), Establish Care (New pt establishing today with dr rosado ), and Colonoscopy (Due for colonoscopy this yea ,maybe )    Subjective          Graeme Lopez presents to [facility]  History of Present Illness    Pleasant 62-year-old male here as a new patient to me to establish care.  He has complaints of insomnia that was previously treated with temazepam.  His last prescription was November 2020.  He has been treated with Ambien in the past that was not helpful, had a negative reaction to trazodone.  In the past he has not used temazepam on a regular basis.  His last prescription has lasted him over 2 years, the 30 tablet supply before that lasted about 4 years.  He only uses them with traveling and if he cannot fall asleep after midnight.  He is aware of the risk of dependence.  He does enjoy traveling, especially watching his daughter play golf and in college.    He also has chronic intermittent prostatitis followed by Dr. Sy.  He has taken doxycycline for this in the past but is now off of this.  He also was given a prescription of meloxicam that he takes intermittently if he has pain.  This has helped substantially.  No procedures, no concerns for cancer.  As of now I will continue to do the PSA screen    Objective   Vital Signs:   /76   Pulse 71   Temp 97.8 °F (36.6 °C)   Ht 170.2 cm (67\")   Wt 65.8 kg (145 lb)   SpO2 98%   BMI 22.71 kg/m²     BMI is within normal parameters. No follow-up required.      Physical Exam  Vitals and nursing note reviewed.   Constitutional:       General: He is not in acute distress.     Appearance: He is well-developed.   HENT:      Head: Normocephalic.      Nose: Nose normal.   Cardiovascular:      Rate and Rhythm: Normal rate and regular rhythm.      Heart sounds: Normal heart sounds. No murmur heard.  Pulmonary:      Effort: " Pulmonary effort is normal. No respiratory distress.      Breath sounds: Normal breath sounds.   Musculoskeletal:         General: Normal range of motion.   Skin:     General: Skin is warm and dry.      Findings: No rash.   Neurological:      Mental Status: He is alert and oriented to person, place, and time.   Psychiatric:         Behavior: Behavior normal.         Thought Content: Thought content normal.         Judgment: Judgment normal.        Result Review :   The following data was reviewed by: Bianka Syed MD on 04/08/2022:                         The 10-year ASCVD risk score (Ryleycassius LERMA Jr., et al., 2013) is: 5.3%    Values used to calculate the score:      Age: 62 years      Sex: Male      Is Non- : No      Diabetic: No      Tobacco smoker: No      Systolic Blood Pressure: 114 mmHg      Is BP treated: No      HDL Cholesterol: 73 mg/dL      Total Cholesterol: 198 mg/dL           Assessment and Plan    Diagnoses and all orders for this visit:    1. Other insomnia (Primary)  Assessment & Plan:  Insomnia well controlled, rare use of temazepam.  Expectation for a 30 tablet supply to last about 2 years.  He has tried Ambien without improvement, trazodone with adverse effects.  Thankfully he uses this very rarely and only when traveling.    Plan:  -Refill temazepam, patient in agreement not to use it regularly.  I am hopeful that it will last about 2 years  -The patient has read and signed the [facility] Controlled Substance Contract.  I will continue to see patient for regular follow up appointments.  They are well controlled on their medication.  ULISES has been reviewed by me and is updated every 3 months. The patient is aware of the potential for addiction and dependence.  -Tox obtained today.        Orders:  -     temazepam (RESTORIL) 15 MG capsule; Take 1 capsule by mouth At Night As Needed for Sleep.  Dispense: 30 capsule; Refill: 0  -     ToxASSURE Select 13 (MW) - Urine, Clean  Catch    2. Chronic prostatitis  Assessment & Plan:  Well-controlled currently, he does see  for this.  Meloxicam used episodically when he has flares and doxycycline.  No symptoms currently.    Orders:  -     Urinalysis With Microscopic - Urine, Clean Catch    3. Eosinophilic esophagitis  Assessment & Plan:  Esophagitis stable, he does take PPI episodically.  He last saw GI in 2019 and due for scope this October.  He will need both colonoscopy and EGD.  Asymptomatic currently.      4. Screening for hyperlipidemia  -     Lipid Panel    5. Screening for deficiency anemia  -     CBC & Differential    6. Screening for diabetes mellitus  -     Comprehensive Metabolic Panel    7. Screening PSA (prostate specific antigen)  -     PSA Screen    8. Therapeutic drug monitoring  -     ToxASSURE Select 13 (MW) - Urine, Clean Catch     Also due for screening labs as above.  We will perform them today as he is fasting.  Follow-up next year for physical.    I spent 44 minutes caring for Graeme on this date of service. This time includes time spent by me in the following activities:preparing for the visit, reviewing tests, performing a medically appropriate examination and/or evaluation , counseling and educating the patient/family/caregiver, ordering medications, tests, or procedures and documenting information in the medical record  Follow Up   Return in about 6 months (around 10/8/2022), or if symptoms worsen or fail to improve, for Annual Exam with fasting labs prior.  Patient was given instructions and counseling regarding his condition or for health maintenance advice. Please see specific information pulled into the AVS if appropriate. Medical assistant and I wore mask and eyewear protection during entire encounter.  Patient wore mask.    Bianka Syed MD

## 2022-04-08 NOTE — ASSESSMENT & PLAN NOTE
Esophagitis stable, he does take PPI episodically.  He last saw GI in 2019 and due for scope this October.  He will need both colonoscopy and EGD.  Asymptomatic currently.

## 2022-04-08 NOTE — ASSESSMENT & PLAN NOTE
Insomnia well controlled, rare use of temazepam.  Expectation for a 30 tablet supply to last about 2 years.  He has tried Ambien without improvement, trazodone with adverse effects.  Thankfully he uses this very rarely and only when traveling.    Plan:  -Refill temazepam, patient in agreement not to use it regularly.  I am hopeful that it will last about 2 years  -The patient has read and signed the [facility] Controlled Substance Contract.  I will continue to see patient for regular follow up appointments.  They are well controlled on their medication.  ULISES has been reviewed by me and is updated every 3 months. The patient is aware of the potential for addiction and dependence.  -Tox obtained today.

## 2022-04-09 LAB
ALBUMIN SERPL-MCNC: 4.6 G/DL (ref 3.8–4.9)
ALBUMIN/GLOB SERPL: 2.1 {RATIO} (ref 1.2–2.2)
ALP SERPL-CCNC: 100 IU/L (ref 44–121)
ALT SERPL-CCNC: 19 IU/L (ref 0–44)
AST SERPL-CCNC: 24 IU/L (ref 0–40)
BASOPHILS # BLD AUTO: 0.1 X10E3/UL (ref 0–0.2)
BASOPHILS NFR BLD AUTO: 1 %
BILIRUB SERPL-MCNC: 0.5 MG/DL (ref 0–1.2)
BUN SERPL-MCNC: 16 MG/DL (ref 8–27)
BUN/CREAT SERPL: 15 (ref 10–24)
CALCIUM SERPL-MCNC: 10 MG/DL (ref 8.6–10.2)
CHLORIDE SERPL-SCNC: 99 MMOL/L (ref 96–106)
CHOLEST SERPL-MCNC: 201 MG/DL (ref 100–199)
CO2 SERPL-SCNC: 27 MMOL/L (ref 20–29)
CREAT SERPL-MCNC: 1.08 MG/DL (ref 0.76–1.27)
EGFRCR SERPLBLD CKD-EPI 2021: 79 ML/MIN/1.73
EOSINOPHIL # BLD AUTO: 0.3 X10E3/UL (ref 0–0.4)
EOSINOPHIL NFR BLD AUTO: 4 %
ERYTHROCYTE [DISTWIDTH] IN BLOOD BY AUTOMATED COUNT: 12.1 % (ref 11.6–15.4)
GLOBULIN SER CALC-MCNC: 2.2 G/DL (ref 1.5–4.5)
GLUCOSE SERPL-MCNC: 91 MG/DL (ref 65–99)
GLUCOSE UR QL STRIP: NORMAL
HCT VFR BLD AUTO: 46.5 % (ref 37.5–51)
HDLC SERPL-MCNC: 66 MG/DL
HGB BLD-MCNC: 15.4 G/DL (ref 13–17.7)
IMM GRANULOCYTES # BLD AUTO: 0 X10E3/UL (ref 0–0.1)
IMM GRANULOCYTES NFR BLD AUTO: 0 %
KETONES UR QL STRIP: NORMAL
LDLC SERPL CALC-MCNC: 119 MG/DL (ref 0–99)
LYMPHOCYTES # BLD AUTO: 1.6 X10E3/UL (ref 0.7–3.1)
LYMPHOCYTES NFR BLD AUTO: 19 %
MCH RBC QN AUTO: 28.7 PG (ref 26.6–33)
MCHC RBC AUTO-ENTMCNC: 33.1 G/DL (ref 31.5–35.7)
MCV RBC AUTO: 87 FL (ref 79–97)
MONOCYTES # BLD AUTO: 0.8 X10E3/UL (ref 0.1–0.9)
MONOCYTES NFR BLD AUTO: 10 %
NEUTROPHILS # BLD AUTO: 5.5 X10E3/UL (ref 1.4–7)
NEUTROPHILS NFR BLD AUTO: 66 %
PH UR STRIP: NORMAL [PH]
PLATELET # BLD AUTO: 325 X10E3/UL (ref 150–450)
POTASSIUM SERPL-SCNC: 4.9 MMOL/L (ref 3.5–5.2)
PROT SERPL-MCNC: 6.8 G/DL (ref 6–8.5)
PROT UR QL STRIP: NORMAL
PSA SERPL-MCNC: 0.9 NG/ML (ref 0–4)
RBC # BLD AUTO: 5.36 X10E6/UL (ref 4.14–5.8)
SODIUM SERPL-SCNC: 140 MMOL/L (ref 134–144)
SP GR UR STRIP: NORMAL
SPECIMEN STATUS: NORMAL
TRIGL SERPL-MCNC: 90 MG/DL (ref 0–149)
VLDLC SERPL CALC-MCNC: 16 MG/DL (ref 5–40)
WBC # BLD AUTO: 8.3 X10E3/UL (ref 3.4–10.8)

## 2022-04-10 LAB
APPEARANCE UR: CLEAR
BACTERIA #/AREA URNS HPF: NORMAL /[HPF]
BILIRUB UR QL STRIP: NEGATIVE
CASTS URNS QL MICRO: NORMAL /LPF
COLOR UR: YELLOW
EPI CELLS #/AREA URNS HPF: NORMAL /HPF (ref 0–10)
GLUCOSE UR QL STRIP: NEGATIVE
HGB UR QL STRIP: NEGATIVE
KETONES UR QL STRIP: NEGATIVE
LEUKOCYTE ESTERASE UR QL STRIP: NEGATIVE
MICRO URNS: NORMAL
MICRO URNS: NORMAL
NITRITE UR QL STRIP: NEGATIVE
NTI URINE TUBE (GRAY): NORMAL
PH UR STRIP: 7.5 [PH] (ref 5–7.5)
PROT UR QL STRIP: NEGATIVE
RBC #/AREA URNS HPF: NORMAL /HPF (ref 0–2)
SP GR UR STRIP: 1.01 (ref 1–1.03)
UROBILINOGEN UR STRIP-MCNC: 0.2 MG/DL (ref 0.2–1)
WBC #/AREA URNS HPF: NORMAL /HPF (ref 0–5)

## 2022-04-14 LAB — DRUGS UR: NORMAL

## 2022-04-21 NOTE — PROGRESS NOTES
Please call patient back with results.  The urine labs has resulted as normal.  Please let us know if you have further questions.     Thank you

## 2022-04-25 ENCOUNTER — TELEPHONE (OUTPATIENT)
Dept: FAMILY MEDICINE CLINIC | Facility: CLINIC | Age: 62
End: 2022-04-25

## 2022-04-25 NOTE — TELEPHONE ENCOUNTER
Please call patient with remaining results, kidney and liver function are normal, cholesterol is slightly higher than before but not needing medication changes at this time.  Negative diabetes screen, negative prostate cancer screen.  No anemia or abnormal blood counts.      Also, I am not sure why these results did not come to my in basket?  I did not review them but they are also not in my tasks.

## 2022-05-16 ENCOUNTER — TELEPHONE (OUTPATIENT)
Dept: FAMILY MEDICINE CLINIC | Facility: CLINIC | Age: 62
End: 2022-05-16

## 2022-05-16 ENCOUNTER — TRANSCRIBE ORDERS (OUTPATIENT)
Dept: ADMINISTRATIVE | Facility: HOSPITAL | Age: 62
End: 2022-05-16

## 2022-05-16 DIAGNOSIS — Z13.6 ENCOUNTER FOR SCREENING FOR VASCULAR DISEASE: Primary | ICD-10-CM

## 2022-05-16 NOTE — TELEPHONE ENCOUNTER
Patient wife called and stated that patient was seen on 4/8/22 he was told that it was an [facility] care wellness visit. He had everything done at that visit that you would on a physical including labs but he received a bill that none of it was covered. He is wondering why the diagnosis codes were not documented that it was physical? He said he also was told to schedule his next physical a year from that date which also lead him to believe he had had a physical done please advise

## 2022-05-16 NOTE — TELEPHONE ENCOUNTER
He was scheduled as a new patient to establish care and discuss insomnia appointment because of a refill of temazepam which she was given.  This would not fall under a physical code.  If I billed a physical code in addition to an office visit for this medication, would actually increase the cost to make it significantly so in general I do not do physicals for any patients.  There are elements of eating healthy and exercising regularly that I do talk about at every visit because a this is part of being a good doctor.  So I can see how it would feel similar to a physical.  However, technically I did not perform a physical exam and did recommend that he make a 6-month follow-up to make a physical appointment.

## 2022-06-08 ENCOUNTER — OFFICE VISIT (OUTPATIENT)
Dept: CARDIOLOGY | Facility: CLINIC | Age: 62
End: 2022-06-08

## 2022-06-08 VITALS
HEART RATE: 65 BPM | BODY MASS INDEX: 22.6 KG/M2 | SYSTOLIC BLOOD PRESSURE: 122 MMHG | HEIGHT: 67 IN | DIASTOLIC BLOOD PRESSURE: 88 MMHG | WEIGHT: 144 LBS

## 2022-06-08 DIAGNOSIS — I49.3 VENTRICULAR PREMATURE BEATS: Primary | ICD-10-CM

## 2022-06-08 PROCEDURE — 93000 ELECTROCARDIOGRAM COMPLETE: CPT | Performed by: INTERNAL MEDICINE

## 2022-06-08 PROCEDURE — 99213 OFFICE O/P EST LOW 20 MIN: CPT | Performed by: INTERNAL MEDICINE

## 2022-06-08 NOTE — PROGRESS NOTES
Date of Office Visit: 2022  Encounter Provider: Parmjit Rosales MD  Place of Service: [facility] CARDIOLOGY  Patient Name: Graeme Lopez  : 1960    Subjective:     Encounter Date:2022      Patient ID: Graeme Lopez is a 62 y.o. male who has a cc of PVCs     He still has occ flutters -- once months, skips in the left chest.      The patient had a good year.   No anginal chest pain,   No sig lagunas,   No soa,   No fainting,  No orthostasis.   No edema.   Exercise tolerance: plays golf, weights. Etc.       There have been no hospital admission since the last visit.     There have been no bleeding events.       Past Medical History:   Diagnosis Date   • Acute URI    • Adhesive capsulitis of shoulder     left shoulder pain   • Allergic 1995    Sulfa   • Chronic prostatitis    • Colon polyp     Removed during colonoscopy   • Eosinophilic esophagitis     Noted on EGD biopsy 2016 by Dr. Chambers   • Esophageal stricture     Dilated 2016   • Eustachian tube dysfunction    • GERD (gastroesophageal reflux disease)    • Hypercholesteremia    • Low back pain    • Lumbar radiculopathy    • Lumbar spondylosis    • Palpitations    • Plantar fasciitis    • Premature ventricular contraction    • Skin cancer    • Stye     right   • Tubular adenoma of colon     Colonoscopy -Normal       Social History     Socioeconomic History   • Marital status:    • Number of children: 1   Tobacco Use   • Smoking status: Never Smoker   • Smokeless tobacco: Never Used   Vaping Use   • Vaping Use: Never used   Substance and Sexual Activity   • Alcohol use: Yes     Types: 2 Glasses of wine per week     Comment: Socially   • Drug use: No   • Sexual activity: Yes     Partners: Female     Birth control/protection: Post-menopausal       Family History   Problem Relation Age of Onset   • Atrial fibrillation Mother    • Hyperlipidemia Mother    • Hypertension Mother    • Pancreatic  "cancer Mother 82   • Hearing loss Mother    • Thyroid disease Mother    • Heart disease Mother         Atrial fibrillation   • Heart failure Father    • Coronary artery disease Father         CABG X 3, and [id number]   • Cancer Father         Skin   • Heart disease Father         Coronary artery disease   • Hyperlipidemia Father    • Heart attack Father 53   • No Known Problems Sister    • No Known Problems Daughter    • Ovarian cancer Maternal Grandmother    • Alcohol abuse Maternal Grandfather        Review of Systems   Constitutional: Negative for fever and night sweats.   HENT: Negative for ear pain and stridor.    Eyes: Negative for discharge and visual halos.   Cardiovascular: Negative for cyanosis.   Respiratory: Negative for hemoptysis and sputum production.    Hematologic/Lymphatic: Negative for adenopathy.   Skin: Negative for nail changes and unusual hair distribution.   Musculoskeletal: Negative for gout and joint swelling.   Gastrointestinal: Negative for bowel incontinence and flatus.   Genitourinary: Negative for dysuria and flank pain.   Neurological: Negative for seizures and tremors.   Psychiatric/Behavioral: Negative for altered mental status. The patient is not nervous/anxious.             Objective:     Vitals:    06/08/22 1051   BP: 122/88   Pulse: 65   Weight: 65.3 kg (144 lb)   Height: 170.2 cm (67\")         Eyes:      General:         Right eye: No discharge.         Left eye: No discharge.   HENT:      Head: Normocephalic and atraumatic.   Neck:      Thyroid: No thyromegaly.      Vascular: No JVD.   Pulmonary:      Effort: Pulmonary effort is normal.      Breath sounds: Normal breath sounds. No rales.   Cardiovascular:      Normal rate. Regular rhythm.      No gallop.   Edema:     Peripheral edema absent.   Abdominal:      General: Bowel sounds are normal.      Palpations: Abdomen is soft.      Tenderness: There is no abdominal tenderness.   Musculoskeletal: Normal range of motion.         " General: No deformity. Skin:     General: Skin is warm and dry.      Findings: No erythema.   Neurological:      Mental Status: Alert and oriented to person, place, and time.      Motor: Normal muscle tone.   Psychiatric:         Behavior: Behavior normal.         Thought Content: Thought content normal.           ECG 12 Lead    Date/Time: 6/8/2022 11:03 AM  Performed by: Parmjit Rosales MD  Authorized by: Parmjit Rosales MD   Comparison: compared with previous ECG   Similar to previous ECG  Rhythm: sinus rhythm  Rate: normal  Conduction: conduction normal  ST Segments: ST segments normal  T Waves: T waves normal  QRS axis: normal    Clinical impression: normal ECG            Lab Review:       Assessment:          Diagnosis Plan   1. Ventricular premature beats            Plan:     PVCs -- he's doing well. No real problem     He asked about  vascular screening, and I responded that he is about the healthy patient I see.

## 2022-07-05 ENCOUNTER — TELEPHONE (OUTPATIENT)
Dept: GASTROENTEROLOGY | Facility: CLINIC | Age: 62
End: 2022-07-05

## 2022-07-05 NOTE — TELEPHONE ENCOUNTER
Dr Chambers,  This pt was scoped last by Dr Sams 10/14/19 recommendations at that time was a fu in 3 years.

## 2022-07-05 NOTE — TELEPHONE ENCOUNTER
Caller: Graeme Lopez    Relationship to patient: Self    Best call back number:[phone]    Patient is needing: DR. SHANNA JONES -- PATIENT CALLED IN ASKING TO SCHEDULE COLONOSCOPY.  (PLEASE AVOID CALLING 7/06/22 CALL ANY DAY AFTER)            yes

## 2022-07-12 NOTE — TELEPHONE ENCOUNTER
Caller: Graeme Lopez    Relationship to patient: Self    Best call back number: [phone]    Chief complaint: NONE -TIME TO BE CHECKED AGAIN    Type of visit: COLONOSCOPY    Requested date: PLEASE CALL PATIENT TO SCHEDULE AS SOON AS CAN.

## 2022-07-26 ENCOUNTER — TELEPHONE (OUTPATIENT)
Dept: GASTROENTEROLOGY | Facility: CLINIC | Age: 62
End: 2022-07-26

## 2022-08-08 ENCOUNTER — HOSPITAL ENCOUNTER (OUTPATIENT)
Dept: CARDIOLOGY | Facility: HOSPITAL | Age: 62
Discharge: HOME | End: 2022-08-08

## 2022-08-08 ENCOUNTER — HOSPITAL ENCOUNTER (OUTPATIENT)
Dept: CT IMAGING | Facility: HOSPITAL | Age: 62
Discharge: HOME | End: 2022-08-08

## 2022-08-08 DIAGNOSIS — Z13.6 ENCOUNTER FOR SCREENING FOR VASCULAR DISEASE: ICD-10-CM

## 2022-08-08 PROCEDURE — 93799 UNLISTED CV SVC/PROCEDURE: CPT

## 2022-08-08 PROCEDURE — 75571 CT HRT W/O DYE W/CA TEST: CPT

## 2022-08-09 LAB
BH CV XLRA MEAS - MID AO DIAM: 1.7 CM
BH CV XLRA MEAS - PAD LEFT ABI PT: 1.22
BH CV XLRA MEAS - PAD LEFT ARM: 113 MMHG
BH CV XLRA MEAS - PAD LEFT LEG PT: 144 MMHG
BH CV XLRA MEAS - PAD RIGHT ABI PT: 1.22
BH CV XLRA MEAS - PAD RIGHT ARM: 118 MMHG
BH CV XLRA MEAS - PAD RIGHT LEG PT: 144 MMHG
BH CV XLRA MEAS LEFT DIST CCA EDV: 27 CM/SEC
BH CV XLRA MEAS LEFT DIST CCA PSV: 71.7 CM/SEC
BH CV XLRA MEAS LEFT ICA/CCA RATIO: 0.7
BH CV XLRA MEAS LEFT MID CCA PSV: 72 CM/SEC
BH CV XLRA MEAS LEFT MID ICA PSV: 48 CM/SEC
BH CV XLRA MEAS LEFT PROX ICA EDV: -22 CM/SEC
BH CV XLRA MEAS LEFT PROX ICA PSV: -47.7 CM/SEC
BH CV XLRA MEAS RIGHT DIST CCA EDV: 21.1 CM/SEC
BH CV XLRA MEAS RIGHT DIST CCA PSV: 78.6 CM/SEC
BH CV XLRA MEAS RIGHT ICA/CCA RATIO: 0.7
BH CV XLRA MEAS RIGHT MID CCA PSV: 79 CM/SEC
BH CV XLRA MEAS RIGHT MID ICA PSV: 55 CM/SEC
BH CV XLRA MEAS RIGHT PROX ICA EDV: -24.9 CM/SEC
BH CV XLRA MEAS RIGHT PROX ICA PSV: -54.6 CM/SEC
MAXIMAL PREDICTED HEART RATE: 160 BPM
STRESS TARGET HR: 136 BPM

## 2022-08-24 NOTE — PROGRESS NOTES
Please call patient back with results.  The CT scan of the heart has resulted as moderate risk of heart disease.  I would recommend getting an cardiologist.  If you would like cardiology but no availability appointment atorvastatin 20 mg nightly, patient in agreement with medication and recommended them and follow-up with me in 3 months.  Please let us know if you have further questions.     Thank you

## 2022-08-24 NOTE — PROGRESS NOTES
Please call patient back with results.  The carotid arteries has resulted as negative for abnormal finding.  Please let us know if you have further questions.     Thank you

## 2022-08-25 ENCOUNTER — TELEPHONE (OUTPATIENT)
Dept: FAMILY MEDICINE CLINIC | Facility: CLINIC | Age: 62
End: 2022-08-25

## 2022-08-25 DIAGNOSIS — I25.83 CORONARY ARTERY DISEASE DUE TO LIPID RICH PLAQUE: Primary | ICD-10-CM

## 2022-08-25 DIAGNOSIS — I25.10 CORONARY ARTERY DISEASE DUE TO LIPID RICH PLAQUE: Primary | ICD-10-CM

## 2022-08-25 RX ORDER — ATORVASTATIN CALCIUM 20 MG/1
20 TABLET, FILM COATED ORAL NIGHTLY
Qty: 90 TABLET | Refills: 1 | Status: SHIPPED | OUTPATIENT
Start: 2022-08-25 | End: 2023-02-27

## 2022-08-25 NOTE — TELEPHONE ENCOUNTER
I sent prescription of atorvastatin 20 to the pharmacy.  Please advise him to take it at night, it may cause symptoms of myalgia, muscle pain especially in the big muscles of his thighs and shoulders.  I would like to see him in 3 months to recheck labs and monitor for any side effects.  Please advise him to see me in 3 months with labs prior.    Labs due at follow-up, CMP, lipids and CBC.

## 2022-08-29 ENCOUNTER — PRE-PROCEDURE SCREENING (OUTPATIENT)
Dept: GASTROENTEROLOGY | Facility: CLINIC | Age: 62
End: 2022-08-29

## 2022-08-29 NOTE — TELEPHONE ENCOUNTER
Last scope 10/14/19 in epic--Personal history of polyps--Family history of polyps--No family history of colon cancer--No blood thinners--Medications:              atorvastatin (LIPITOR) 20 MG tablet  Calcium Citrate-Vitamin D (CALCIUM + D PO)  EPINEPHrine (EpiPen 2-Yogi) 0.3 MG/0.3ML solution auto-injector injection  Loratadine 10 MG capsule  meloxicam (MOBIC) 15 MG tablet  Multiple Vitamin (MULTI VITAMIN DAILY PO)  omeprazole (priLOSEC) 40 MG capsule  temazepam (RESTORIL) 15 MG capsule         Pt verbalized and understood that it would be few weeks before been schedule

## 2022-08-31 DIAGNOSIS — Z83.71 FAMILY HISTORY OF POLYPS IN THE COLON: ICD-10-CM

## 2022-08-31 DIAGNOSIS — D12.6 ADENOMATOUS POLYP OF COLON, UNSPECIFIED PART OF COLON: Primary | ICD-10-CM

## 2022-10-10 ENCOUNTER — OUTSIDE FACILITY SERVICE (OUTPATIENT)
Dept: GASTROENTEROLOGY | Facility: CLINIC | Age: 62
End: 2022-10-10

## 2022-10-10 PROCEDURE — 45378 DIAGNOSTIC COLONOSCOPY: CPT | Performed by: INTERNAL MEDICINE

## 2022-11-08 DIAGNOSIS — Z00.00 ENCOUNTER FOR HEALTH MAINTENANCE EXAMINATION IN ADULT: ICD-10-CM

## 2022-11-08 DIAGNOSIS — I25.10 CORONARY ARTERY DISEASE DUE TO LIPID RICH PLAQUE: ICD-10-CM

## 2022-11-08 DIAGNOSIS — Z13.0 SCREENING FOR DEFICIENCY ANEMIA: ICD-10-CM

## 2022-11-08 DIAGNOSIS — I25.83 CORONARY ARTERY DISEASE DUE TO LIPID RICH PLAQUE: ICD-10-CM

## 2022-11-08 DIAGNOSIS — Z13.220 SCREENING FOR HYPERLIPIDEMIA: Primary | ICD-10-CM

## 2022-11-08 DIAGNOSIS — Z13.1 SCREENING FOR DIABETES MELLITUS: ICD-10-CM

## 2022-11-08 DIAGNOSIS — E55.9 HYPOVITAMINOSIS D: ICD-10-CM

## 2022-11-18 LAB
25(OH)D3+25(OH)D2 SERPL-MCNC: 49.9 NG/ML (ref 30–100)
ALBUMIN SERPL-MCNC: 4.4 G/DL (ref 3.5–5.2)
ALBUMIN/GLOB SERPL: 2.1 G/DL
ALP SERPL-CCNC: 89 U/L (ref 39–117)
ALT SERPL-CCNC: 22 U/L (ref 1–41)
AST SERPL-CCNC: 22 U/L (ref 1–40)
BASOPHILS # BLD AUTO: 0.07 10*3/MM3 (ref 0–0.2)
BASOPHILS NFR BLD AUTO: 0.8 % (ref 0–1.5)
BILIRUB SERPL-MCNC: 0.5 MG/DL (ref 0–1.2)
BUN SERPL-MCNC: 17 MG/DL (ref 8–23)
BUN/CREAT SERPL: 15.2 (ref 7–25)
CALCIUM SERPL-MCNC: 10.1 MG/DL (ref 8.6–10.5)
CHLORIDE SERPL-SCNC: 101 MMOL/L (ref 98–107)
CHOLEST SERPL-MCNC: 145 MG/DL (ref 0–200)
CO2 SERPL-SCNC: 29 MMOL/L (ref 22–29)
CREAT SERPL-MCNC: 1.12 MG/DL (ref 0.76–1.27)
EGFRCR SERPLBLD CKD-EPI 2021: 75.2 ML/MIN/1.73
EOSINOPHIL # BLD AUTO: 0.71 10*3/MM3 (ref 0–0.4)
EOSINOPHIL NFR BLD AUTO: 8.1 % (ref 0.3–6.2)
ERYTHROCYTE [DISTWIDTH] IN BLOOD BY AUTOMATED COUNT: 12.4 % (ref 12.3–15.4)
GLOBULIN SER CALC-MCNC: 2.1 GM/DL
GLUCOSE SERPL-MCNC: 93 MG/DL (ref 65–99)
HCT VFR BLD AUTO: 45.5 % (ref 37.5–51)
HDLC SERPL-MCNC: 66 MG/DL (ref 40–60)
HGB BLD-MCNC: 15 G/DL (ref 13–17.7)
IMM GRANULOCYTES # BLD AUTO: 0.02 10*3/MM3 (ref 0–0.05)
IMM GRANULOCYTES NFR BLD AUTO: 0.2 % (ref 0–0.5)
LDLC SERPL CALC-MCNC: 64 MG/DL (ref 0–100)
LDLC/HDLC SERPL: 0.95 {RATIO}
LYMPHOCYTES # BLD AUTO: 2.13 10*3/MM3 (ref 0.7–3.1)
LYMPHOCYTES NFR BLD AUTO: 24.2 % (ref 19.6–45.3)
MCH RBC QN AUTO: 29.6 PG (ref 26.6–33)
MCHC RBC AUTO-ENTMCNC: 33 G/DL (ref 31.5–35.7)
MCV RBC AUTO: 89.7 FL (ref 79–97)
MONOCYTES # BLD AUTO: 1.03 10*3/MM3 (ref 0.1–0.9)
MONOCYTES NFR BLD AUTO: 11.7 % (ref 5–12)
NEUTROPHILS # BLD AUTO: 4.84 10*3/MM3 (ref 1.7–7)
NEUTROPHILS NFR BLD AUTO: 55 % (ref 42.7–76)
NRBC BLD AUTO-RTO: 0 /100 WBC (ref 0–0.2)
PLATELET # BLD AUTO: 288 10*3/MM3 (ref 140–450)
POTASSIUM SERPL-SCNC: 4.7 MMOL/L (ref 3.5–5.2)
PROT SERPL-MCNC: 6.5 G/DL (ref 6–8.5)
RBC # BLD AUTO: 5.07 10*6/MM3 (ref 4.14–5.8)
SODIUM SERPL-SCNC: 140 MMOL/L (ref 136–145)
TRIGL SERPL-MCNC: 80 MG/DL (ref 0–150)
TSH SERPL DL<=0.005 MIU/L-ACNC: 1.52 UIU/ML (ref 0.27–4.2)
VLDLC SERPL CALC-MCNC: 15 MG/DL (ref 5–40)
WBC # BLD AUTO: 8.8 10*3/MM3 (ref 3.4–10.8)

## 2022-11-28 ENCOUNTER — OFFICE VISIT (OUTPATIENT)
Dept: FAMILY MEDICINE CLINIC | Facility: CLINIC | Age: 62
End: 2022-11-28

## 2022-11-28 VITALS
TEMPERATURE: 97.3 F | HEIGHT: 67 IN | OXYGEN SATURATION: 97 % | BODY MASS INDEX: 22.6 KG/M2 | DIASTOLIC BLOOD PRESSURE: 82 MMHG | WEIGHT: 144 LBS | HEART RATE: 69 BPM | SYSTOLIC BLOOD PRESSURE: 119 MMHG

## 2022-11-28 DIAGNOSIS — I25.83 CORONARY ARTERY DISEASE DUE TO LIPID RICH PLAQUE: Primary | ICD-10-CM

## 2022-11-28 DIAGNOSIS — I25.10 CORONARY ARTERY DISEASE DUE TO LIPID RICH PLAQUE: Primary | ICD-10-CM

## 2022-11-28 DIAGNOSIS — G47.09 OTHER INSOMNIA: ICD-10-CM

## 2022-11-28 DIAGNOSIS — Z82.49 FAMILY HISTORY OF EARLY CAD: ICD-10-CM

## 2022-11-28 PROCEDURE — 99213 OFFICE O/P EST LOW 20 MIN: CPT | Performed by: FAMILY MEDICINE

## 2022-11-28 RX ORDER — ERGOCALCIFEROL 1.25 MG/1
CAPSULE ORAL
COMMUNITY
End: 2023-01-25 | Stop reason: SDUPTHER

## 2022-11-28 RX ORDER — TEMAZEPAM 15 MG/1
15 CAPSULE ORAL NIGHTLY PRN
Qty: 30 CAPSULE | Refills: 0 | Status: SHIPPED | OUTPATIENT
Start: 2022-11-28

## 2022-11-28 NOTE — PROGRESS NOTES
"Chief Complaint  Heart Problem (Follow up for cardio vascular screening. f)    Subjective        Graeme Lopez presents to [facility] PRIMARY [facility]  History of Present Illness  Pleasant 62-year-old male here to ask about cardiovascular screening.  He does have a strong family history of CAD, however he has not had any particular cardiac problems aside from hyperlipidemia.  He did she is to do a CT cardiac calcium score with results of moderate plaque burden with a moderate risk of cardiac events in the next 5 years.  He is aware that this test does overestimate his cardiac risk, we discussed that it may be better to assess his cardiac risk in other ways.    Objective   Vital Signs:  /82   Pulse 69   Temp 97.3 °F (36.3 °C)   Ht 170.2 cm (67\")   Wt 65.3 kg (144 lb)   SpO2 97%   BMI 22.55 kg/m²   Estimated body mass index is 22.55 kg/m² as calculated from the following:    Height as of this encounter: 170.2 cm (67\").    Weight as of this encounter: 65.3 kg (144 lb).    BMI is within normal parameters. No other follow-up for BMI required.      Physical Exam  Vitals and nursing note reviewed.   Constitutional:       General: He is not in acute distress.     Appearance: He is well-developed.   HENT:      Head: Normocephalic.      Nose: Nose normal.   Cardiovascular:      Rate and Rhythm: Normal rate and regular rhythm.      Heart sounds: Normal heart sounds. No murmur heard.  Pulmonary:      Effort: Pulmonary effort is normal. No respiratory distress.      Breath sounds: Normal breath sounds.   Musculoskeletal:         General: Normal range of motion.   Skin:     General: Skin is warm and dry.      Findings: No rash.   Neurological:      Mental Status: He is alert and oriented to person, place, and time.   Psychiatric:         Behavior: Behavior normal.         Thought Content: Thought content normal.         Judgment: Judgment normal.        Result Review :  The following data was reviewed " by: Bianka Syed MD on 11/28/2022:  CMP    CMP 4/8/22 11/17/22   Glucose 91 93   BUN 16 17   Creatinine 1.08 1.12   Sodium 140 140   Potassium 4.9 4.7   Chloride 99 101   Calcium 10.0 10.1   Total Protein 6.8 6.5   Albumin 4.6 4.40   Globulin 2.2 2.1   Total Bilirubin 0.5 0.5   Alkaline Phosphatase 100 89   AST (SGOT) 24 22   ALT (SGPT) 19 22           CBC    CBC 4/8/22 11/17/22   WBC 8.3 8.80   RBC 5.36 5.07   Hemoglobin 15.4 15.0   Hematocrit 46.5 45.5   MCV 87 89.7   MCH 28.7 29.6   MCHC 33.1 33.0   RDW 12.1 12.4   Platelets 325 288           Lipid Panel    Lipid Panel 4/8/22 11/17/22   Total Cholesterol 201 (A) 145   Triglycerides 90 80   HDL Cholesterol 66 66 (A)   VLDL Cholesterol 16 15   LDL Cholesterol  119 (A) 64   LDL/HDL Ratio  0.95   (A) Abnormal value       Comments are available for some flowsheets but are not being displayed.                     Assessment and Plan   Diagnoses and all orders for this visit:    1. Coronary artery disease due to lipid rich plaque (Primary)  -     Treadmill Stress Test; Future    2. Family history of early CAD  -     Treadmill Stress Test; Future    3. Other insomnia  -     temazepam (RESTORIL) 15 MG capsule; Take 1 capsule by mouth At Night As Needed for Sleep.  Dispense: 30 capsule; Refill: 0    Plan to continue with statin, he has had an excellent response to atorvastatin 20 mg, no adverse effects.  With his history of CAD in the findings of moderate plaque on CT cardiac screening plan to get treadmill stress test as above.  If there are abnormal findings I will recommend that he follow-up with cardiology.  Otherwise continue with lifestyle management, regular exercise, low-fat diet, managing stress and getting healthy sleep.    He also does have difficulty sleeping when traveling, usually when temazepam prescription will last him a year.  Okay to use as needed.  Jose appropriate.       Follow Up   Return in about 6 months (around 5/28/2023), or if symptoms worsen  or fail to improve, for Annual Exam with fasting labs prior.  Patient was given instructions and counseling regarding his condition or for health maintenance advice. Please see specific information pulled into the AVS if appropriate. Medical assistant and I wore mask and eyewear protection during entire encounter.  Patient wore mask.    Bianka Syed MD

## 2022-12-05 ENCOUNTER — HOSPITAL ENCOUNTER (OUTPATIENT)
Dept: CARDIOLOGY | Facility: HOSPITAL | Age: 62
Discharge: HOME | End: 2022-12-05
Admitting: FAMILY MEDICINE

## 2022-12-05 DIAGNOSIS — Z82.49 FAMILY HISTORY OF EARLY CAD: ICD-10-CM

## 2022-12-05 DIAGNOSIS — I25.83 CORONARY ARTERY DISEASE DUE TO LIPID RICH PLAQUE: ICD-10-CM

## 2022-12-05 DIAGNOSIS — I25.10 CORONARY ARTERY DISEASE DUE TO LIPID RICH PLAQUE: ICD-10-CM

## 2022-12-05 LAB
BH CV STRESS BP STAGE 1: NORMAL
BH CV STRESS BP STAGE 2: NORMAL
BH CV STRESS BP STAGE 3: NORMAL
BH CV STRESS DURATION MIN STAGE 1: 3
BH CV STRESS DURATION MIN STAGE 2: 3
BH CV STRESS DURATION MIN STAGE 3: 3
BH CV STRESS DURATION MIN STAGE 4: 1
BH CV STRESS DURATION SEC STAGE 1: 0
BH CV STRESS DURATION SEC STAGE 2: 0
BH CV STRESS DURATION SEC STAGE 3: 0
BH CV STRESS DURATION SEC STAGE 4: 0
BH CV STRESS GRADE STAGE 1: 10
BH CV STRESS GRADE STAGE 2: 12
BH CV STRESS GRADE STAGE 3: 14
BH CV STRESS GRADE STAGE 4: 16
BH CV STRESS HR STAGE 1: 86
BH CV STRESS HR STAGE 2: 103
BH CV STRESS HR STAGE 3: 133
BH CV STRESS HR STAGE 4: 160
BH CV STRESS METS STAGE 1: 5
BH CV STRESS METS STAGE 2: 7.5
BH CV STRESS METS STAGE 3: 10
BH CV STRESS METS STAGE 4: 11
BH CV STRESS PROTOCOL 1: NORMAL
BH CV STRESS RECOVERY BP: NORMAL MMHG
BH CV STRESS RECOVERY HR: 88 BPM
BH CV STRESS SPEED STAGE 1: 1.7
BH CV STRESS SPEED STAGE 2: 2.5
BH CV STRESS SPEED STAGE 3: 3.4
BH CV STRESS SPEED STAGE 4: 4.2
BH CV STRESS STAGE 1: 1
BH CV STRESS STAGE 2: 2
BH CV STRESS STAGE 3: 3
BH CV STRESS STAGE 4: 4
MAXIMAL PREDICTED HEART RATE: 160 BPM
PERCENT MAX PREDICTED HR: 100 %
STRESS BASELINE BP: NORMAL MMHG
STRESS BASELINE HR: 77 BPM
STRESS PERCENT HR: 118 %
STRESS POST ESTIMATED WORKLOAD: 11 METS
STRESS POST EXERCISE DUR MIN: 10 MIN
STRESS POST EXERCISE DUR SEC: 0 SEC
STRESS POST PEAK BP: NORMAL MMHG
STRESS POST PEAK HR: 160 BPM
STRESS TARGET HR: 136 BPM

## 2022-12-05 PROCEDURE — 93016 CV STRESS TEST SUPVJ ONLY: CPT | Performed by: INTERNAL MEDICINE

## 2022-12-05 PROCEDURE — 93018 CV STRESS TEST I&R ONLY: CPT | Performed by: INTERNAL MEDICINE

## 2022-12-05 PROCEDURE — 93017 CV STRESS TEST TRACING ONLY: CPT

## 2023-01-25 ENCOUNTER — OFFICE VISIT (OUTPATIENT)
Dept: CARDIOLOGY | Facility: CLINIC | Age: 63
End: 2023-01-25
Payer: COMMERCIAL

## 2023-01-25 VITALS
WEIGHT: 148.4 LBS | SYSTOLIC BLOOD PRESSURE: 136 MMHG | HEART RATE: 69 BPM | DIASTOLIC BLOOD PRESSURE: 88 MMHG | BODY MASS INDEX: 23.29 KG/M2 | HEIGHT: 67 IN

## 2023-01-25 DIAGNOSIS — I49.3 VENTRICULAR PREMATURE BEATS: Primary | ICD-10-CM

## 2023-01-25 PROCEDURE — 99213 OFFICE O/P EST LOW 20 MIN: CPT | Performed by: NURSE PRACTITIONER

## 2023-01-25 PROCEDURE — 93000 ELECTROCARDIOGRAM COMPLETE: CPT | Performed by: NURSE PRACTITIONER

## 2023-01-25 NOTE — PROGRESS NOTES
Date of Office Visit: 2023  Encounter Provider: ZENOBIA Mane  Place of Service: [facility] CARDIOLOG[facility]  Patient Name: Graeme Lopez  :1960    Chief Complaint   Patient presents with   • ventricular premature beats   :     HPI: Graeme Lopez is a 62 y.o. male who follows with Dr. Rosales for palpitations/PVC's .    Presents for follow up.     He saw PCP in Nov ---ask about cardiovascular screening secondary to family hx of CAD ---regular treadmill stress test in Dec---no clinical evidence of myocardial ischemia, low risk for ischemia heart disease. He did the vascular screening package offered at [facility] in August---it was normal.     He also did a CT cardiac calcium score----327--moderate plaque burden ---moderate risk of cardiac events in the next 5 years. Dr. Syed started him on statin---his numbers have improved.     He has not had any chest pain, dyspnea, PND, orthopnea or edema.     He still has occasional palpitations from time to time---many years ago he had prn flecainide---he has not taken any in years.      Past Medical History:   Diagnosis Date   • Acute URI    • Adhesive capsulitis of shoulder     left shoulder pain   • Allergic 1995    Sulfa   • Chronic prostatitis    • Colon polyp     Removed during colonoscopy   • Eosinophilic esophagitis     Noted on EGD biopsy 2016 by Dr. Chambers   • Esophageal stricture     Dilated 2016   • Eustachian tube dysfunction    • GERD (gastroesophageal reflux disease)    • Hypercholesteremia    • Low back pain    • Lumbar radiculopathy    • Lumbar spondylosis    • Palpitations    • Plantar fasciitis    • Premature ventricular contraction    • Skin cancer    • Stye     right   • Tubular adenoma of colon     Colonoscopy 2014-Normal       Past Surgical History:   Procedure Laterality Date   • APPENDECTOMY     • COLONOSCOPY      NAUN RAI MD   • COLONOSCOPY N/A 10/14/2019    Procedure:  COLONOSCOPY TO CECUM WITH COLD BIOPSIES AND COLD SNARE POLYPECTOMY;  Surgeon: Flavio Urena MD;  Location: [facility] ENDOSCOPY;  Service: General   • SKIN CANCER EXCISION     • SKIN TAG REMOVAL     • TONSILLECTOMY         Social History     Socioeconomic History   • Marital status:    • Number of children: 1   Tobacco Use   • Smoking status: Never   • Smokeless tobacco: Never   Vaping Use   • Vaping Use: Never used   Substance and Sexual Activity   • Alcohol use: Yes     Types: 2 Glasses of wine per week     Comment: Socially   • Drug use: No   • Sexual activity: Yes     Partners: Female     Birth control/protection: Post-menopausal       Family History   Problem Relation Age of Onset   • Atrial fibrillation Mother    • Hyperlipidemia Mother    • Hypertension Mother    • Pancreatic cancer Mother 82   • Hearing loss Mother    • Thyroid disease Mother    • Heart disease Mother         Atrial fibrillation   • Heart failure Father    • Coronary artery disease Father         CABG X 3, and [id number]   • Cancer Father         Skin   • Heart disease Father         Coronary artery disease   • Hyperlipidemia Father    • Heart attack Father 53   • No Known Problems Sister    • No Known Problems Daughter    • Ovarian cancer Maternal Grandmother    • Alcohol abuse Maternal Grandfather        Review of Systems   Constitutional: Negative for chills, fever and malaise/fatigue.   Cardiovascular: Negative for chest pain, dyspnea on exertion, leg swelling, near-syncope, orthopnea, palpitations, paroxysmal nocturnal dyspnea and syncope.   Respiratory: Negative for cough and shortness of breath.    Musculoskeletal: Negative for joint pain, joint swelling and myalgias.   Gastrointestinal: Negative for abdominal pain, diarrhea, melena, nausea and vomiting.   Genitourinary: Negative for frequency and hematuria.   Neurological: Negative for light-headedness, numbness, paresthesias and seizures.   Allergic/Immunologic: Negative.    All  "other systems reviewed and are negative.      Allergies   Allergen Reactions   • Sulfa Antibiotics Rash         Current Outpatient Medications:   •  atorvastatin (LIPITOR) 20 MG tablet, Take 1 tablet by mouth Every Night., Disp: 90 tablet, Rfl: 1  •  Calcium Citrate-Vitamin D (CALCIUM + D PO), Take  by mouth., Disp: , Rfl:   •  EPINEPHrine (EpiPen 2-Yogi) 0.3 MG/0.3ML solution auto-injector injection, Inject 0.3 mL into the appropriate muscle as directed by prescriber 1 (One) Time As Needed (allergic reaction/anaphylaxis) for up to 1 dose., Disp: 1 each, Rfl: 0  •  meloxicam (MOBIC) 15 MG tablet, Take 15 mg by mouth Daily As Needed., Disp: , Rfl:   •  Multiple Vitamin (MULTI VITAMIN DAILY PO), Take by mouth., Disp: , Rfl:   •  omeprazole (priLOSEC) 40 MG capsule, Take 40 mg by mouth Daily As Needed., Disp: , Rfl:   •  temazepam (RESTORIL) 15 MG capsule, Take 1 capsule by mouth At Night As Needed for Sleep., Disp: 30 capsule, Rfl: 0      Objective:     Vitals:    01/25/23 1157   BP: 136/88   Pulse: 69   Weight: 67.3 kg (148 lb 6.4 oz)   Height: 170.2 cm (67\")     Body mass index is 23.24 kg/m².    PHYSICAL EXAM:    Vitals Reviewed.   General Appearance: No acute distress, well developed and well nourished.   Eyes: Conjunctiva and lids: No erythema, swelling, or discharge. Sclera non-icteric.   HENT: Atraumatic, normocephalic. External eyes, ears, and nose normal.   Respiratory: No signs of respiratory distress. Respiration rhythm and depth normal.   Clear to auscultation. No rales, crackles, rhonchi, or wheezing auscultated.   Cardiovascular:  Heart Rate and Rhythm: Normal, Heart Sounds: Normal S1 and S2. No S3 or S4 noted.  Murmurs: No murmurs noted. No rubs, thrills, or gallops.   Lower Extremities: No edema noted.  Gastrointestinal:  Abdomen soft, non-distended, non-tender.   Musculoskeletal: Normal movement of extremities  Skin: Warm and dry.   Psychiatric: Patient alert and oriented to person, place, and time. " Speech and behavior appropriate. Normal mood and affect.       ECG 12 Lead    Date/Time: 1/25/2023 12:01 PM  Performed by: Lala Kwok APRN  Authorized by: Lala Kwok APRN   Comparison: compared with previous ECG   Similar to previous ECG  Rhythm: sinus rhythm  BPM: 69                Assessment:       Diagnosis Plan   1. Ventricular premature beats               Plan:       1. PVC's/palpitations---occasional episodes, in the past had as needed flec but has not taken any for years. Instructed to call if had increase and we could send some if needed.     Follow up with Dr. Rosales in 1 year.     As always, it has been a pleasure to participate in your patient's care.      Sincerely,         ZENOBIA Pickett

## 2023-02-09 ENCOUNTER — OFFICE VISIT (OUTPATIENT)
Dept: FAMILY MEDICINE CLINIC | Facility: CLINIC | Age: 63
End: 2023-02-09
Payer: COMMERCIAL

## 2023-02-09 VITALS
SYSTOLIC BLOOD PRESSURE: 110 MMHG | OXYGEN SATURATION: 97 % | WEIGHT: 147 LBS | HEIGHT: 67 IN | DIASTOLIC BLOOD PRESSURE: 70 MMHG | HEART RATE: 72 BPM | BODY MASS INDEX: 23.07 KG/M2 | TEMPERATURE: 97.8 F

## 2023-02-09 DIAGNOSIS — M43.17 SPONDYLOLISTHESIS OF LUMBOSACRAL REGION: Primary | ICD-10-CM

## 2023-02-09 PROCEDURE — 96372 THER/PROPH/DIAG INJ SC/IM: CPT | Performed by: FAMILY MEDICINE

## 2023-02-09 PROCEDURE — 99213 OFFICE O/P EST LOW 20 MIN: CPT | Performed by: FAMILY MEDICINE

## 2023-02-09 RX ORDER — PREDNISONE 20 MG/1
40 TABLET ORAL DAILY
Qty: 10 TABLET | Refills: 0 | Status: SHIPPED | OUTPATIENT
Start: 2023-02-09 | End: 2023-02-14

## 2023-02-09 RX ORDER — METHYLPREDNISOLONE SODIUM SUCCINATE 40 MG/ML
40 INJECTION, POWDER, LYOPHILIZED, FOR SOLUTION INTRAMUSCULAR; INTRAVENOUS ONCE
Status: COMPLETED | OUTPATIENT
Start: 2023-02-09 | End: 2023-02-09

## 2023-02-09 RX ADMIN — METHYLPREDNISOLONE SODIUM SUCCINATE 40 MG: 40 INJECTION, POWDER, LYOPHILIZED, FOR SOLUTION INTRAMUSCULAR; INTRAVENOUS at 15:31

## 2023-02-09 NOTE — PROGRESS NOTES
"Answers for HPI/ROS submitted by the patient on 2/4/2023  What is the primary reason for your visit?: Back Pain    Chief Complaint  Back Pain (Lower back pain go down to hips worse left side  sine jan 22   after playing golf  sometimes tingling in upper legs ,when from big pain to now discomfort  took some meloxican  ,nothing since 2 days ago )    Subjective        Graeme Lopez presents to [facility]  History of Present Illness  Pleasant 62-year-old male here for back pain, started January 22 after playing golf.  THe next day he could not get out of bed.  It was hard to get out of bed te first few days but able to walk and more of an ache, he did take a few days of meloxicam. He wants to play golf for the next 2-0-30 years. He does still do exercises a few times a week.    Objective   Vital Signs:  /70   Pulse 72   Temp 97.8 °F (36.6 °C)   Ht 170.2 cm (67\")   Wt 66.7 kg (147 lb)   SpO2 97%   BMI 23.02 kg/m²   Estimated body mass index is 23.02 kg/m² as calculated from the following:    Height as of this encounter: 170.2 cm (67\").    Weight as of this encounter: 66.7 kg (147 lb).       BMI is within normal parameters. No other follow-up for BMI required.      Physical Exam  Vitals and nursing note reviewed.   Constitutional:       General: He is not in acute distress.     Appearance: He is well-developed.   HENT:      Head: Normocephalic.      Nose: Nose normal.   Cardiovascular:      Rate and Rhythm: Normal rate and regular rhythm.      Heart sounds: Normal heart sounds. No murmur heard.  Pulmonary:      Effort: Pulmonary effort is normal. No respiratory distress.      Breath sounds: Normal breath sounds.   Musculoskeletal:         General: Normal range of motion.      Comments: No tenderness to palpation of the lumbar spine, some left-sided paraspinal tenderness especially.  Patient stands slowly with assistance of his hands.   Skin:     General: Skin is warm and dry. "      Findings: No rash.   Neurological:      Mental Status: He is alert and oriented to person, place, and time.   Psychiatric:         Behavior: Behavior normal.         Thought Content: Thought content normal.         Judgment: Judgment normal.        Result Review :                   Assessment and Plan   Diagnoses and all orders for this visit:    1. Spondylolisthesis of lumbosacral region (Primary)  -     methylPREDNISolone sodium succinate (SOLU-Medrol) injection 40 mg  -     predniSONE (DELTASONE) 20 MG tablet; Take 2 tablets by mouth Daily for 5 days.  Dispense: 10 tablet; Refill: 0  -     Ambulatory Referral to Physical Therapy Evaluate and treat    Pleasant 62-year-old male here to follow-up for known 4 mm anterior spondylolisthesis at L4 on L5, initially diagnosed in 2002, repeat MRI showing stability in 2014.  He gets episodes of severe pain every 2 years or so.  It does seem that this episode was likely provoked by cleaning out his garage and playing golf.  His symptoms of radiculopathy are still present but overall improving.  He does have improvement particularly of the severe pain but does have numbness going down both legs.  He has improved with conservative management in the past, he has been taking meloxicam 7.5 that he has previously    Plan:  - Avoid symptoms that trigger pain  -Continue with NSAIDs, prednisone, home exercises  -Referral to physical therapy  - Follow-up in 6 weeks if not improving         Follow Up   Return in about 6 weeks (around 3/23/2023), or if symptoms worsen or fail to improve, for Recheck back pain .  Patient was given instructions and counseling regarding his condition or for health maintenance advice. Please see specific information pulled into the AVS if appropriate. Medical assistant and I wore mask and eyewear protection during entire encounter.  Patient wore mask.    Bianka Syed MD

## 2023-02-20 RX ORDER — LIDOCAINE 50 MG/G
1 PATCH TOPICAL EVERY 24 HOURS
Qty: 30 EACH | Refills: 3 | Status: SHIPPED | OUTPATIENT
Start: 2023-02-20

## 2023-02-27 DIAGNOSIS — I25.10 CORONARY ARTERY DISEASE DUE TO LIPID RICH PLAQUE: ICD-10-CM

## 2023-02-27 DIAGNOSIS — I25.83 CORONARY ARTERY DISEASE DUE TO LIPID RICH PLAQUE: ICD-10-CM

## 2023-02-27 RX ORDER — ATORVASTATIN CALCIUM 20 MG/1
TABLET, FILM COATED ORAL
Qty: 90 TABLET | Refills: 1 | Status: SHIPPED | OUTPATIENT
Start: 2023-02-27

## 2023-03-31 DIAGNOSIS — Z13.0 SCREENING FOR DEFICIENCY ANEMIA: ICD-10-CM

## 2023-03-31 DIAGNOSIS — Z13.1 SCREENING FOR DIABETES MELLITUS: Primary | ICD-10-CM

## 2023-03-31 DIAGNOSIS — Z51.81 THERAPEUTIC DRUG MONITORING: ICD-10-CM

## 2023-03-31 DIAGNOSIS — E55.9 HYPOVITAMINOSIS D: ICD-10-CM

## 2023-03-31 DIAGNOSIS — Z13.220 SCREENING FOR HYPERLIPIDEMIA: ICD-10-CM

## 2023-04-01 LAB
ALBUMIN SERPL-MCNC: 4.6 G/DL (ref 3.5–5.2)
ALBUMIN/GLOB SERPL: 2.1 G/DL
ALP SERPL-CCNC: 98 U/L (ref 39–117)
ALT SERPL-CCNC: 29 U/L (ref 1–41)
AST SERPL-CCNC: 27 U/L (ref 1–40)
BASOPHILS # BLD AUTO: 0.06 10*3/MM3 (ref 0–0.2)
BASOPHILS NFR BLD AUTO: 0.8 % (ref 0–1.5)
BILIRUB SERPL-MCNC: 0.6 MG/DL (ref 0–1.2)
BUN SERPL-MCNC: 18 MG/DL (ref 8–23)
BUN/CREAT SERPL: 16.1 (ref 7–25)
CALCIUM SERPL-MCNC: 10 MG/DL (ref 8.6–10.5)
CHLORIDE SERPL-SCNC: 96 MMOL/L (ref 98–107)
CHOLEST SERPL-MCNC: 140 MG/DL (ref 0–200)
CO2 SERPL-SCNC: 29.3 MMOL/L (ref 22–29)
CREAT SERPL-MCNC: 1.12 MG/DL (ref 0.76–1.27)
EGFRCR SERPLBLD CKD-EPI 2021: 74.7 ML/MIN/1.73
EOSINOPHIL # BLD AUTO: 0.48 10*3/MM3 (ref 0–0.4)
EOSINOPHIL NFR BLD AUTO: 6.4 % (ref 0.3–6.2)
ERYTHROCYTE [DISTWIDTH] IN BLOOD BY AUTOMATED COUNT: 11.8 % (ref 12.3–15.4)
GLOBULIN SER CALC-MCNC: 2.2 GM/DL
GLUCOSE SERPL-MCNC: 89 MG/DL (ref 65–99)
HBA1C MFR BLD: 5.8 % (ref 4.8–5.6)
HCT VFR BLD AUTO: 42 % (ref 37.5–51)
HDLC SERPL-MCNC: 69 MG/DL (ref 40–60)
HGB BLD-MCNC: 14.3 G/DL (ref 13–17.7)
IMM GRANULOCYTES # BLD AUTO: 0.02 10*3/MM3 (ref 0–0.05)
IMM GRANULOCYTES NFR BLD AUTO: 0.3 % (ref 0–0.5)
LDLC SERPL CALC-MCNC: 60 MG/DL (ref 0–100)
LDLC/HDLC SERPL: 0.88 {RATIO}
LYMPHOCYTES # BLD AUTO: 1.87 10*3/MM3 (ref 0.7–3.1)
LYMPHOCYTES NFR BLD AUTO: 24.8 % (ref 19.6–45.3)
MCH RBC QN AUTO: 29.9 PG (ref 26.6–33)
MCHC RBC AUTO-ENTMCNC: 34 G/DL (ref 31.5–35.7)
MCV RBC AUTO: 87.7 FL (ref 79–97)
MONOCYTES # BLD AUTO: 0.84 10*3/MM3 (ref 0.1–0.9)
MONOCYTES NFR BLD AUTO: 11.2 % (ref 5–12)
NEUTROPHILS # BLD AUTO: 4.26 10*3/MM3 (ref 1.7–7)
NEUTROPHILS NFR BLD AUTO: 56.5 % (ref 42.7–76)
NRBC BLD AUTO-RTO: 0 /100 WBC (ref 0–0.2)
PLATELET # BLD AUTO: 272 10*3/MM3 (ref 140–450)
POTASSIUM SERPL-SCNC: 4.5 MMOL/L (ref 3.5–5.2)
PROT SERPL-MCNC: 6.8 G/DL (ref 6–8.5)
RBC # BLD AUTO: 4.79 10*6/MM3 (ref 4.14–5.8)
SODIUM SERPL-SCNC: 137 MMOL/L (ref 136–145)
TRIGL SERPL-MCNC: 50 MG/DL (ref 0–150)
VLDLC SERPL CALC-MCNC: 11 MG/DL (ref 5–40)
WBC # BLD AUTO: 7.53 10*3/MM3 (ref 3.4–10.8)

## 2023-04-10 ENCOUNTER — OFFICE VISIT (OUTPATIENT)
Dept: FAMILY MEDICINE CLINIC | Facility: CLINIC | Age: 63
End: 2023-04-10
Payer: COMMERCIAL

## 2023-04-10 VITALS
OXYGEN SATURATION: 98 % | SYSTOLIC BLOOD PRESSURE: 110 MMHG | TEMPERATURE: 97.1 F | BODY MASS INDEX: 22.76 KG/M2 | HEIGHT: 67 IN | DIASTOLIC BLOOD PRESSURE: 72 MMHG | WEIGHT: 145 LBS | HEART RATE: 68 BPM

## 2023-04-10 DIAGNOSIS — Z00.00 HEALTH MAINTENANCE EXAMINATION: Primary | ICD-10-CM

## 2023-04-10 DIAGNOSIS — G47.09 OTHER INSOMNIA: ICD-10-CM

## 2023-04-10 PROCEDURE — 99396 PREV VISIT EST AGE 40-64: CPT | Performed by: FAMILY MEDICINE

## 2023-04-10 RX ORDER — TEMAZEPAM 15 MG/1
15 CAPSULE ORAL NIGHTLY PRN
Qty: 30 CAPSULE | Refills: 0 | Status: SHIPPED | OUTPATIENT
Start: 2023-04-10

## 2023-04-10 NOTE — PROGRESS NOTES
"Chief Complaint  Annual Exam (No complains doing well had labs done prior )    Subjective        Graeme Lopez presents to [facility]  History of Present Illness  Annual Exam.    Health Maintenance   Topic Date Due   • COVID-19 Vaccine (5 - Booster for Moderna series) 02/25/2022   • INFLUENZA VACCINE  08/01/2023   • LIPID PANEL  03/31/2024   • ANNUAL PHYSICAL  04/10/2024   • TDAP/TD VACCINES (2 - Td or Tdap) 06/17/2025   • COLORECTAL CANCER SCREENING  10/10/2027   • ZOSTER VACCINE  Completed   • HEPATITIS C SCREENING  Addressed   • Pneumococcal Vaccine 0-64  Aged Out     Diet: Doing well over all, low fat foods, good veggies. Minimal ETOH - a few drinks a week.   Exercise: Staying active, walking - back issues have improved.   Immunizations: UTD   Colon cancer screening: UTd 10/222 Q 5 years   Sleep/mental health: over all doing well, he does use the temazepam to sleep for 6-7 hours and does well.   Dentist: UTD  Vision: UTD  Derm: UTD     Objective   Vital Signs:  /72   Pulse 68   Temp 97.1 °F (36.2 °C)   Ht 170.2 cm (67\")   Wt 65.8 kg (145 lb)   SpO2 98%   BMI 22.71 kg/m²   Estimated body mass index is 22.71 kg/m² as calculated from the following:    Height as of this encounter: 170.2 cm (67\").    Weight as of this encounter: 65.8 kg (145 lb).       BMI is within normal parameters. No other follow-up for BMI required.      Physical Exam  Vitals reviewed.   Constitutional:       General: He is not in acute distress.     Appearance: Normal appearance. He is well-developed. He is not diaphoretic.   HENT:      Head: Normocephalic and atraumatic. Hair is normal.      Right Ear: Hearing, tympanic membrane, ear canal and external ear normal.      Left Ear: Hearing, tympanic membrane, ear canal and external ear normal.      Nose: Nose normal. No nasal deformity.      Mouth/Throat:      Mouth: Mucous membranes are moist. No oral lesions.      Pharynx: Uvula midline. No uvula " swelling.   Eyes:      General: Lids are normal. No scleral icterus.        Right eye: No discharge.         Left eye: No discharge.      Extraocular Movements: Extraocular movements intact.      Right eye: Normal extraocular motion and no nystagmus.      Left eye: Normal extraocular motion and no nystagmus.      Conjunctiva/sclera: Conjunctivae normal.      Pupils: Pupils are equal, round, and reactive to light.   Neck:      Thyroid: No thyromegaly.      Vascular: No JVD.   Cardiovascular:      Rate and Rhythm: Normal rate and regular rhythm.      Pulses: Normal pulses.      Heart sounds: Normal heart sounds. No murmur heard.    No gallop.   Pulmonary:      Effort: Pulmonary effort is normal. No respiratory distress.      Breath sounds: Normal breath sounds. No wheezing or rales.   Chest:      Chest wall: No tenderness.   Abdominal:      General: Bowel sounds are normal. There is no distension.      Palpations: Abdomen is soft. There is no mass.      Tenderness: There is no abdominal tenderness. There is no guarding.      Hernia: No hernia is present.   Musculoskeletal:         General: No tenderness or deformity. Normal range of motion.      Cervical back: Normal range of motion and neck supple.   Lymphadenopathy:      Cervical: No cervical adenopathy.   Skin:     General: Skin is warm and dry.      Findings: No rash.   Neurological:      Mental Status: He is alert and oriented to person, place, and time.      Cranial Nerves: No cranial nerve deficit.      Motor: No abnormal muscle tone.      Coordination: Coordination normal.      Deep Tendon Reflexes: Reflexes are normal and symmetric. Reflexes normal.   Psychiatric:         Mood and Affect: Mood normal.         Behavior: Behavior normal.         Thought Content: Thought content normal.         Judgment: Judgment normal.        Result Review :  The following data was reviewed by: Bianka Raymond MD on 04/10/2023:  [facility] 11/17/22 3/31/23   Glucose 93 89    BUN 17 18   Creatinine 1.12 1.12   Sodium 140 137   Potassium 4.7 4.5   Chloride 101 96 (A)   Calcium 10.1 10.0   Total Protein 6.5 6.8   Albumin 4.40 4.6   Globulin 2.1 2.2   Total Bilirubin 0.5 0.6   Alkaline Phosphatase 89 98   AST (SGOT) 22 27   ALT (SGPT) 22 29   BUN/Creatinine Ratio 15.2 16.1   (A) Abnormal value            CBC    CBC 11/17/22 3/31/23   WBC 8.80 7.53   RBC 5.07 4.79   Hemoglobin 15.0 14.3   Hematocrit 45.5 42.0   MCV 89.7 87.7   MCH 29.6 29.9   MCHC 33.0 34.0   RDW 12.4 11.8 (A)   Platelets 288 272   (A) Abnormal value            Lipid Panel    Lipid Panel 11/17/22 3/31/23   Total Cholesterol 145 140   Triglycerides 80 50   HDL Cholesterol 66 (A) 69 (A)   VLDL Cholesterol 15 11   LDL Cholesterol  64 60   LDL/HDL Ratio 0.95 0.88   (A) Abnormal value       Comments are available for some flowsheets but are not being displayed.           A1C Last 3 Results    HGBA1C Last 3 Results 3/31/23   Hemoglobin A1C 5.80 (A)   (A) Abnormal value       Comments are available for some flowsheets but are not being displayed.                        Assessment and Plan   Diagnoses and all orders for this visit:    1. Health maintenance examination (Primary)    2. Other insomnia  -     temazepam (RESTORIL) 15 MG capsule; Take 1 capsule by mouth At Night As Needed for Sleep.  Dispense: 30 capsule; Refill: 0    Here for annual exam, fasting labs overall stable, mild hyperglycemia but very healthy lifestyle, no concerns.,  Discussed limiting potatoes, immunizations up-to-date, colon cancer screening up-to-date, cardiovascular screening negative for symptoms, counseled patient to increase vegetable intake, water and 150 minutes of exercise weekly combining weightbearing exercises and aerobic activity.    Patient is hyperglycemia is very stable and well controlled okay to follow-up annually.    Insomnia well-controlled on rare temazepam.  Usually 30 tablet supply will last him a year, uses mostly when traveling        Follow Up   Return in about 1 year (around 4/10/2024), or if symptoms worsen or fail to improve, for Annual Physical with fasting labs prior.  Patient was given instructions and counseling regarding his condition or for health maintenance advice. Please see specific information pulled into the AVS if appropriate.     Bianka Raymond MD

## 2023-06-19 NOTE — DISCHARGE INSTRUCTIONS
For the next 24 hours patient needs to be with a responsible adult.    For 24 hours DO NOT drive, operate machinery, appliances, drink alcohol, make important decisions or sign legal documents.    Start with a light or bland diet if you are feeling sick to your stomach otherwise advance to regular diet as tolerated.    Follow recommendations on procedure report if provided by your doctor.    Call Dr Urena     Problems may include but not limited to: large amounts of bleeding, trouble breathing, repeated vomiting, severe unrelieved pain, fever or chills.         Patient here today with mom for administration of Hep A vaccine. Vaccine administered and patient tolerated well.

## 2023-08-30 DIAGNOSIS — I25.10 CORONARY ARTERY DISEASE DUE TO LIPID RICH PLAQUE: ICD-10-CM

## 2023-08-30 DIAGNOSIS — I25.83 CORONARY ARTERY DISEASE DUE TO LIPID RICH PLAQUE: ICD-10-CM

## 2023-08-30 RX ORDER — ATORVASTATIN CALCIUM 20 MG/1
TABLET, FILM COATED ORAL
Qty: 90 TABLET | Refills: 1 | Status: SHIPPED | OUTPATIENT
Start: 2023-08-30

## 2023-12-11 ENCOUNTER — OFFICE VISIT (OUTPATIENT)
Dept: FAMILY MEDICINE CLINIC | Facility: CLINIC | Age: 63
End: 2023-12-11
Payer: COMMERCIAL

## 2023-12-11 VITALS
TEMPERATURE: 98 F | HEIGHT: 67 IN | OXYGEN SATURATION: 98 % | WEIGHT: 143 LBS | HEART RATE: 87 BPM | BODY MASS INDEX: 22.44 KG/M2 | DIASTOLIC BLOOD PRESSURE: 88 MMHG | SYSTOLIC BLOOD PRESSURE: 126 MMHG

## 2023-12-11 DIAGNOSIS — R22.1 NODULE OF NECK: Primary | ICD-10-CM

## 2023-12-11 PROCEDURE — 99213 OFFICE O/P EST LOW 20 MIN: CPT | Performed by: NURSE PRACTITIONER

## 2023-12-11 NOTE — PROGRESS NOTES
"Chief Complaint  Mass (Swollen mass pea sized on R clavicle, tender to the touch x1M )    Subjective        Graeme Lopez presents to [facility]  History of Present Illness  Graeme Lopez is a 63 y.o. male who presents to the clinic today with concerns of a new \"mass\" in his right clavicle area that is tender to the touch.  He noticed this spot 1 month ago and it has consistently increased in size.  The area is painful to the touch.  He denies fever, chills, night sweats, or bone pain.  He denies recent illnesses or infections.    Review of Systems   Constitutional:  Negative for chills, fatigue and fever.   Musculoskeletal:  Negative for arthralgias.       Objective   Vital Signs:  /88   Pulse 87   Temp 98 °F (36.7 °C)   Ht 170.2 cm (67.01\")   Wt 64.9 kg (143 lb)   SpO2 98%   BMI 22.39 kg/m²   Estimated body mass index is 22.39 kg/m² as calculated from the following:    Height as of this encounter: 170.2 cm (67.01\").    Weight as of this encounter: 64.9 kg (143 lb).       BMI is within normal parameters. No other follow-up for BMI required.      Physical Exam  Vitals reviewed.   Constitutional:       General: He is not in acute distress.     Appearance: Normal appearance. He is not ill-appearing, toxic-appearing or diaphoretic.   HENT:      Head: Normocephalic and atraumatic.   Eyes:      General: No scleral icterus.        Right eye: No discharge.         Left eye: No discharge.      Extraocular Movements: Extraocular movements intact.   Neck:        Comments: Movable tender nodule as indicated above  Pulmonary:      Effort: Pulmonary effort is normal. No respiratory distress.   Neurological:      General: No focal deficit present.      Mental Status: He is alert and oriented to person, place, and time.      Motor: No weakness.      Gait: Gait normal.   Psychiatric:         Mood and Affect: Mood normal.         Behavior: Behavior normal.        Result Review " :                   Assessment and Plan   Diagnoses and all orders for this visit:    1. Nodule of neck (Primary)  -     US Head Neck Soft Tissue; Future      Lesion does not appear cystic in nature.  Recommended we proceed with ultrasound for further evaluation.       Follow Up   Return if symptoms worsen or fail to improve.  Patient was given instructions and counseling regarding his condition or for health maintenance advice. Please see specific information pulled into the AVS if appropriate.       Electronically signed by ZENOBIA Jason, 12/11/23, 9:37 AM EST.

## 2023-12-21 ENCOUNTER — HOSPITAL ENCOUNTER (OUTPATIENT)
Dept: ULTRASOUND IMAGING | Facility: HOSPITAL | Age: 63
Discharge: HOME | End: 2023-12-21
Admitting: NURSE PRACTITIONER
Payer: COMMERCIAL

## 2023-12-21 DIAGNOSIS — R22.1 NODULE OF NECK: ICD-10-CM

## 2023-12-21 PROCEDURE — 76536 US EXAM OF HEAD AND NECK: CPT

## 2023-12-28 ENCOUNTER — OFFICE VISIT (OUTPATIENT)
Dept: FAMILY MEDICINE CLINIC | Facility: CLINIC | Age: 63
End: 2023-12-28
Payer: COMMERCIAL

## 2023-12-28 VITALS
HEIGHT: 67 IN | DIASTOLIC BLOOD PRESSURE: 74 MMHG | BODY MASS INDEX: 22.29 KG/M2 | TEMPERATURE: 97.7 F | OXYGEN SATURATION: 98 % | HEART RATE: 67 BPM | WEIGHT: 142 LBS | SYSTOLIC BLOOD PRESSURE: 122 MMHG

## 2023-12-28 DIAGNOSIS — K22.2 ESOPHAGEAL STRICTURE: ICD-10-CM

## 2023-12-28 DIAGNOSIS — K20.0 EOSINOPHILIC ESOPHAGITIS: Primary | ICD-10-CM

## 2023-12-28 DIAGNOSIS — M25.511 STERNOCLAVICULAR JOINT PAIN, RIGHT: ICD-10-CM

## 2023-12-28 PROCEDURE — 99213 OFFICE O/P EST LOW 20 MIN: CPT | Performed by: FAMILY MEDICINE

## 2023-12-28 NOTE — PROGRESS NOTES
"Chief Complaint  Mass (Pt has swelling/mass on the right side of his collarbone and is complaining of soreness/pain today/-pain with exertion )    Subjective        Graeme Lopez presents to [facility]  History of Present Illness  Pleasant 63-year-old male here to follow-up for a mass on the right side of his collarbone that started around Novermber.  It is slightly tender and painful.  Ultrasound of the neck was performed December 21 showing that the muscular tissue in this area is thicker than the opposite side but no fluid collection or mass.  Not sure what started it - not sure if it was exercise, went to PT and now Ortho for torn tendon along the lateral epicondyle    Chronic eosinophilic esophagitis with esophageal stricture in the past.  Due to follow-up with GI.    Objective   Vital Signs:  /74 (BP Location: Right arm, Patient Position: Sitting, Cuff Size: Adult)   Pulse 67   Temp 97.7 °F (36.5 °C) (Infrared)   Ht 170.2 cm (67.01\")   Wt 64.4 kg (142 lb)   SpO2 98%   BMI 22.24 kg/m²   Estimated body mass index is 22.24 kg/m² as calculated from the following:    Height as of this encounter: 170.2 cm (67.01\").    Weight as of this encounter: 64.4 kg (142 lb).       BMI is within normal parameters. No other follow-up for BMI required.      Physical Exam  Vitals and nursing note reviewed.   Constitutional:       General: He is not in acute distress.     Appearance: He is well-developed.   HENT:      Head: Normocephalic.      Nose: Nose normal.   Cardiovascular:      Heart sounds: No murmur heard.  Pulmonary:      Effort: Pulmonary effort is normal. No respiratory distress.   Musculoskeletal:         General: Normal range of motion.      Comments: Minimally tender but prominent firmness along the right sternoclavicular joint in comparison to the left side.  Some tightness along the sternocleidomastoid but again not very notes.  Otherwise normal exam.   Skin:     " General: Skin is warm and dry.      Findings: No rash.   Neurological:      Mental Status: He is alert and oriented to person, place, and time.   Psychiatric:         Behavior: Behavior normal.         Thought Content: Thought content normal.         Judgment: Judgment normal.        Result Review :                   Assessment and Plan   Diagnoses and all orders for this visit:    1. Eosinophilic esophagitis (Primary)  -     Ambulatory Referral to Gastroenterology    2. Esophageal stricture  -     Cancel: XR sternoclavicular 3+ vw bilateral; Future  -     Ambulatory Referral to Gastroenterology    3. Sternoclavicular joint pain, right  -     Cancel: XR sternoclavicular 3+ vw bilateral; Future  -     XR Clavicle Bilateral    X-ray performed today of the right sternoclavicular joint, I do think his symptoms are most likely OA although it is hard to appreciate any narrowing on the x-ray today, official review by radiology pending.  From my read the x-ray is normal with no acute fracture or dislocation.    Plan to continue with home exercises, OTC meds, try to avoid NSAIDs with eosinophilic esophagitis.    He also is due for another scope, his last visit with Dr. Chambers was in 2020.         Follow Up   Return if symptoms worsen or fail to improve.  Patient was given instructions and counseling regarding his condition or for health maintenance advice. Please see specific information pulled into the AVS if appropriate.     Bianka Raymond MD

## 2024-01-09 ENCOUNTER — TELEPHONE (OUTPATIENT)
Dept: GASTROENTEROLOGY | Facility: CLINIC | Age: 64
End: 2024-01-09

## 2024-01-09 ENCOUNTER — OFFICE VISIT (OUTPATIENT)
Dept: GASTROENTEROLOGY | Facility: CLINIC | Age: 64
End: 2024-01-09
Payer: COMMERCIAL

## 2024-01-09 VITALS
OXYGEN SATURATION: 97 % | HEIGHT: 67 IN | WEIGHT: 149 LBS | HEART RATE: 76 BPM | DIASTOLIC BLOOD PRESSURE: 80 MMHG | BODY MASS INDEX: 23.39 KG/M2 | SYSTOLIC BLOOD PRESSURE: 122 MMHG | TEMPERATURE: 97.8 F

## 2024-01-09 DIAGNOSIS — D12.6 ADENOMATOUS POLYP OF COLON, UNSPECIFIED PART OF COLON: ICD-10-CM

## 2024-01-09 DIAGNOSIS — K20.0 ESOPHAGITIS, EOSINOPHILIC: ICD-10-CM

## 2024-01-09 DIAGNOSIS — K21.9 GASTROESOPHAGEAL REFLUX DISEASE, UNSPECIFIED WHETHER ESOPHAGITIS PRESENT: Primary | ICD-10-CM

## 2024-01-09 DIAGNOSIS — R13.10 DYSPHAGIA, UNSPECIFIED TYPE: ICD-10-CM

## 2024-01-09 PROCEDURE — 99214 OFFICE O/P EST MOD 30 MIN: CPT | Performed by: INTERNAL MEDICINE

## 2024-01-09 RX ORDER — SODIUM CHLORIDE, SODIUM LACTATE, POTASSIUM CHLORIDE, CALCIUM CHLORIDE 600; 310; 30; 20 MG/100ML; MG/100ML; MG/100ML; MG/100ML
30 INJECTION, SOLUTION INTRAVENOUS CONTINUOUS
OUTPATIENT
Start: 2024-01-09

## 2024-01-09 NOTE — PROGRESS NOTES
Chief Complaint   Patient presents with    eosinophilic esophagitis    Abdominal Pain     Swelling     Difficulty Swallowing        Graemetracey Lopez is a  63 y.o. male here for a follow up visit for GERD, eosinophilic esophagitis, dysphagia, history of polyps    HPI this 63-year-old white male patient of Dr. Bianka Raymond presents in follow-up since undergoing colonoscopic examination in October 2022.  He has a history of colon polyps and none were found on that examination.  He also has a history of reflux with dysphagia and findings consistent with eosinophilic esophagitis.  Last upper endoscopy performed in 2016.  Because of persistent symptoms despite the use of intermittent proton pump inhibitor, I advise she consider upper endoscopic evaluation at this time and he is amenable to this.  He may warrant dilation although his last study allowed for dilation to 15 mm.    Past Medical History:   Diagnosis Date    Acute URI     Adhesive capsulitis of shoulder     left shoulder pain    Allergic 1995    Sulfa    Chronic prostatitis     Colon polyp 2015    Removed during colonoscopy    Eosinophilic esophagitis     Noted on EGD biopsy February 11, 2016 by Dr. Chambers    Esophageal stricture     Dilated September 2016    Eustachian tube dysfunction     GERD (gastroesophageal reflux disease)     Hypercholesteremia     Lactose intolerance     Low back pain     Lumbar radiculopathy     Lumbar spondylosis     Palpitations     Partial tear of common extensor tendon of right elbow     Plantar fasciitis     Premature ventricular contraction     Skin cancer     Stye     right    Tubular adenoma of colon     Colonoscopy 2014-Normal       Current Outpatient Medications   Medication Sig Dispense Refill    atorvastatin (LIPITOR) 20 MG tablet TAKE 1 TABLET BY MOUTH EVERY DAY AT NIGHT 90 tablet 1    Calcium Citrate-Vitamin D (CALCIUM + D PO) Take  by mouth.      EPINEPHrine (EpiPen 2-Yogi) 0.3 MG/0.3ML solution auto-injector injection  Inject 0.3 mL into the appropriate muscle as directed by prescriber 1 (One) Time As Needed (allergic reaction/anaphylaxis) for up to 1 dose. 1 each 0    meloxicam (MOBIC) 15 MG tablet Take 1 tablet by mouth Daily As Needed.      Multiple Vitamin (MULTI VITAMIN DAILY PO) Take by mouth.      omeprazole (priLOSEC) 40 MG capsule Take 1 capsule by mouth Daily As Needed.      temazepam (RESTORIL) 15 MG capsule Take 1 capsule by mouth At Night As Needed for Sleep. 30 capsule 0    lidocaine (LIDODERM) 5 % Place 1 patch on the skin as directed by provider Daily. Remove & Discard patch within 12 hours or as directed by MD (Patient not taking: Reported on 1/9/2024) 30 each 3     No current facility-administered medications for this visit.       PRN Meds:.    Allergies   Allergen Reactions    Sulfa Antibiotics Rash       Social History     Socioeconomic History    Marital status:     Number of children: 1   Tobacco Use    Smoking status: Never    Smokeless tobacco: Never   Vaping Use    Vaping Use: Never used   Substance and Sexual Activity    Alcohol use: Yes     Alcohol/week: 3.0 standard drinks of alcohol     Types: 3 Drinks containing 0.5 oz of alcohol per week     Comment: Socially    Drug use: No    Sexual activity: Yes     Partners: Female     Birth control/protection: Post-menopausal       Family History   Problem Relation Age of Onset    Atrial fibrillation Mother     Hyperlipidemia Mother     Hypertension Mother     Pancreatic cancer Mother 82    Hearing loss Mother     Thyroid disease Mother     Heart disease Mother         Atrial fibrillation    Heart failure Father     Coronary artery disease Father         CABG X 3, and [id number]    Cancer Father         Skin    Heart disease Father         Coronary artery disease    Hyperlipidemia Father     Heart attack Father 53    No Known Problems Sister     No Known Problems Daughter     Ovarian cancer Maternal Grandmother     Alcohol abuse Maternal Grandfather         Review of Systems   Constitutional:  Negative for activity change, appetite change, fatigue and unexpected weight change.   HENT:  Negative for congestion, facial swelling, sore throat, trouble swallowing and voice change.    Eyes:  Negative for photophobia and visual disturbance.   Respiratory:  Negative for cough and choking.    Cardiovascular:  Negative for chest pain.   Gastrointestinal:  Negative for abdominal distention, abdominal pain, anal bleeding, blood in stool, constipation, diarrhea, nausea, rectal pain and vomiting.        GERD  Dysphagia   Endocrine: Negative for polyphagia.   Musculoskeletal:  Negative for arthralgias, gait problem and joint swelling.   Skin:  Negative for color change, pallor and rash.   Allergic/Immunologic: Negative for food allergies.   Neurological:  Negative for speech difficulty and headaches.   Hematological:  Does not bruise/bleed easily.   Psychiatric/Behavioral:  Negative for agitation, confusion and sleep disturbance.        Vitals:    01/09/24 1000   BP: 122/80   Pulse: 76   Temp: 97.8 °F (36.6 °C)   SpO2: 97%       Physical Exam    ASSESSMENT   #1 GERD  #2 dysphagia  #3 eosinophilic esophagitis  #4 history of polyps      PLAN  Schedule EGD with possible dilation  Continue PPI  Follow-up colonoscopy in 2027            ICD-10-CM ICD-9-CM   1. Gastroesophageal reflux disease, unspecified whether esophagitis present  K21.9 530.81   2. Esophagitis, eosinophilic  K20.0 530.13   3. Dysphagia, unspecified type  R13.10 787.20   4. Adenomatous polyp of colon, unspecified part of colon  D12.6 211.3

## 2024-01-09 NOTE — TELEPHONE ENCOUNTER
Advised that [facility] will call with final arrival time minimum 24 hrs before procedure. IF they do not get a phone call, arrival time will stay the same as given on instructions [facility] FOR - 04/29/2024  OK FOR HUB TO READ

## 2024-02-13 ENCOUNTER — OFFICE VISIT (OUTPATIENT)
Dept: FAMILY MEDICINE CLINIC | Facility: CLINIC | Age: 64
End: 2024-02-13
Payer: COMMERCIAL

## 2024-02-13 VITALS
DIASTOLIC BLOOD PRESSURE: 72 MMHG | HEART RATE: 82 BPM | TEMPERATURE: 97.6 F | HEIGHT: 67 IN | BODY MASS INDEX: 23.07 KG/M2 | WEIGHT: 147 LBS | SYSTOLIC BLOOD PRESSURE: 118 MMHG | OXYGEN SATURATION: 99 %

## 2024-02-13 DIAGNOSIS — R19.7 DIARRHEA OF PRESUMED INFECTIOUS ORIGIN: Primary | ICD-10-CM

## 2024-02-13 LAB
ALBUMIN SERPL-MCNC: 4.2 G/DL (ref 3.5–5.2)
ALBUMIN/GLOB SERPL: 1.9 G/DL
ALP SERPL-CCNC: 91 U/L (ref 39–117)
ALT SERPL-CCNC: 23 U/L (ref 1–41)
AST SERPL-CCNC: 22 U/L (ref 1–40)
BASOPHILS # BLD AUTO: 0.07 10*3/MM3 (ref 0–0.2)
BASOPHILS NFR BLD AUTO: 0.9 % (ref 0–1.5)
BILIRUB SERPL-MCNC: 0.4 MG/DL (ref 0–1.2)
BUN SERPL-MCNC: 17 MG/DL (ref 8–23)
BUN/CREAT SERPL: 14.7 (ref 7–25)
CALCIUM SERPL-MCNC: 9.5 MG/DL (ref 8.6–10.5)
CHLORIDE SERPL-SCNC: 103 MMOL/L (ref 98–107)
CO2 SERPL-SCNC: 25.5 MMOL/L (ref 22–29)
CREAT SERPL-MCNC: 1.16 MG/DL (ref 0.76–1.27)
EGFRCR SERPLBLD CKD-EPI 2021: 71.2 ML/MIN/1.73
EOSINOPHIL # BLD AUTO: 0.27 10*3/MM3 (ref 0–0.4)
EOSINOPHIL NFR BLD AUTO: 3.6 % (ref 0.3–6.2)
ERYTHROCYTE [DISTWIDTH] IN BLOOD BY AUTOMATED COUNT: 12 % (ref 12.3–15.4)
GLOBULIN SER CALC-MCNC: 2.2 GM/DL
GLUCOSE SERPL-MCNC: 84 MG/DL (ref 65–99)
HCT VFR BLD AUTO: 40.8 % (ref 37.5–51)
HGB BLD-MCNC: 14 G/DL (ref 13–17.7)
IMM GRANULOCYTES # BLD AUTO: 0.02 10*3/MM3 (ref 0–0.05)
IMM GRANULOCYTES NFR BLD AUTO: 0.3 % (ref 0–0.5)
LIPASE SERPL-CCNC: 45 U/L (ref 13–60)
LYMPHOCYTES # BLD AUTO: 1.82 10*3/MM3 (ref 0.7–3.1)
LYMPHOCYTES NFR BLD AUTO: 24.4 % (ref 19.6–45.3)
MCH RBC QN AUTO: 30.2 PG (ref 26.6–33)
MCHC RBC AUTO-ENTMCNC: 34.3 G/DL (ref 31.5–35.7)
MCV RBC AUTO: 88.1 FL (ref 79–97)
MONOCYTES # BLD AUTO: 0.93 10*3/MM3 (ref 0.1–0.9)
MONOCYTES NFR BLD AUTO: 12.5 % (ref 5–12)
NEUTROPHILS # BLD AUTO: 4.35 10*3/MM3 (ref 1.7–7)
NEUTROPHILS NFR BLD AUTO: 58.3 % (ref 42.7–76)
NRBC BLD AUTO-RTO: 0 /100 WBC (ref 0–0.2)
PLATELET # BLD AUTO: 302 10*3/MM3 (ref 140–450)
POTASSIUM SERPL-SCNC: 4.8 MMOL/L (ref 3.5–5.2)
PROT SERPL-MCNC: 6.4 G/DL (ref 6–8.5)
RBC # BLD AUTO: 4.63 10*6/MM3 (ref 4.14–5.8)
SODIUM SERPL-SCNC: 138 MMOL/L (ref 136–145)
TSH SERPL DL<=0.005 MIU/L-ACNC: 0.86 UIU/ML (ref 0.27–4.2)
WBC # BLD AUTO: 7.46 10*3/MM3 (ref 3.4–10.8)

## 2024-02-13 PROCEDURE — 99213 OFFICE O/P EST LOW 20 MIN: CPT | Performed by: FAMILY MEDICINE

## 2024-02-19 ENCOUNTER — OFFICE VISIT (OUTPATIENT)
Age: 64
End: 2024-02-19
Payer: COMMERCIAL

## 2024-02-19 VITALS
DIASTOLIC BLOOD PRESSURE: 84 MMHG | HEART RATE: 63 BPM | WEIGHT: 145 LBS | HEIGHT: 67 IN | BODY MASS INDEX: 22.76 KG/M2 | SYSTOLIC BLOOD PRESSURE: 128 MMHG

## 2024-02-19 DIAGNOSIS — I49.3 VENTRICULAR PREMATURE BEATS: Primary | ICD-10-CM

## 2024-02-19 PROCEDURE — 93000 ELECTROCARDIOGRAM COMPLETE: CPT | Performed by: INTERNAL MEDICINE

## 2024-02-19 PROCEDURE — 99214 OFFICE O/P EST MOD 30 MIN: CPT | Performed by: INTERNAL MEDICINE

## 2024-02-19 NOTE — PROGRESS NOTES
Date of Office Visit: 2024  Encounter Provider: Parmjit Rosales MD  Place of Service: [facility] CARDIOLOGY  Patient Name: Graeme Lopez  : 1960    Subjective:     Encounter Date:2024      Patient ID: Graeme Lopez is a 64 y.o. male who has a cc of  PVCs and he is here for fu.     The patient had a good year.   No anginal chest pain,   No sig lagunas,   No soa,   No fainting,  No orthostasis.   No edema.   Exercise tolerance: good -- he plays golf.     There have been no hospital admission since the last visit.     There have been no bleeding events.       Past Medical History:   Diagnosis Date    Acute URI     Adhesive capsulitis of shoulder     left shoulder pain    Allergic 1995    Sulfa    Chronic prostatitis     Colon polyp     Removed during colonoscopy    Eosinophilic esophagitis     Noted on EGD biopsy 2016 by Dr. Chambers    Esophageal stricture     Dilated 2016    Eustachian tube dysfunction     GERD (gastroesophageal reflux disease)     Hypercholesteremia     Lactose intolerance     Low back pain     Lumbar radiculopathy     Lumbar spondylosis     Palpitations     Partial tear of common extensor tendon of right elbow     Plantar fasciitis     Premature ventricular contraction     Skin cancer     Stye     right    Tubular adenoma of colon     Colonoscopy -Normal       Social History     Socioeconomic History    Marital status:     Number of children: 1   Tobacco Use    Smoking status: Never    Smokeless tobacco: Never   Vaping Use    Vaping Use: Never used   Substance and Sexual Activity    Alcohol use: Yes     Alcohol/week: 3.0 standard drinks of alcohol     Types: 3 Drinks containing 0.5 oz of alcohol per week     Comment: Socially    Drug use: No    Sexual activity: Yes     Partners: Female     Birth control/protection: Post-menopausal       Family History   Problem Relation Age of Onset    Atrial fibrillation Mother      "Hyperlipidemia Mother     Hypertension Mother     Pancreatic cancer Mother 82    Hearing loss Mother     Thyroid disease Mother     Heart disease Mother         Atrial fibrillation    Heart failure Father     Coronary artery disease Father         CABG X 3, and [id number]    Cancer Father         Skin    Heart disease Father         Coronary artery disease    Hyperlipidemia Father     Heart attack Father 53    No Known Problems Sister     No Known Problems Daughter     Ovarian cancer Maternal Grandmother     Alcohol abuse Maternal Grandfather        Review of Systems   Constitutional: Negative for fever and night sweats.   HENT:  Negative for ear pain and stridor.    Eyes:  Negative for discharge and visual halos.   Cardiovascular:  Negative for cyanosis.   Respiratory:  Negative for hemoptysis and sputum production.    Hematologic/Lymphatic: Negative for adenopathy.   Skin:  Negative for nail changes and unusual hair distribution.   Musculoskeletal:  Negative for gout and joint swelling.   Gastrointestinal:  Negative for bowel incontinence and flatus.   Genitourinary:  Negative for dysuria and flank pain.   Neurological:  Negative for seizures and tremors.   Psychiatric/Behavioral:  Negative for altered mental status. The patient is not nervous/anxious.             Objective:     Vitals:    02/19/24 0918   BP: 128/84   Pulse: 63   Weight: 65.8 kg (145 lb)   Height: 170.2 cm (67\")         Eyes:      General:         Right eye: No discharge.         Left eye: No discharge.   HENT:      Head: Normocephalic and atraumatic.   Neck:      Thyroid: No thyromegaly.      Vascular: No JVD.   Pulmonary:      Effort: Pulmonary effort is normal.      Breath sounds: Normal breath sounds. No rales.   Cardiovascular:      Normal rate. Regular rhythm.      No gallop.    Edema:     Peripheral edema absent.   Abdominal:      General: Bowel sounds are normal.      Palpations: Abdomen is soft.      Tenderness: There is no abdominal " tenderness.   Musculoskeletal: Normal range of motion.         General: No deformity. Skin:     General: Skin is warm and dry.      Findings: No erythema.   Neurological:      Mental Status: Alert and oriented to person, place, and time.      Motor: Normal muscle tone.   Psychiatric:         Behavior: Behavior normal.         Thought Content: Thought content normal.           ECG 12 Lead    Date/Time: 2/19/2024 9:52 AM  Performed by: Parmjit Rosales MD    Authorized by: Parmjit Rosales MD  Rhythm: sinus rhythm  Rate: normal  Conduction: conduction normal  ST Segments: ST segments normal  T Waves: T waves normal  QRS axis: normal    Clinical impression: normal ECG          Lab Review:       Assessment:          Diagnosis Plan   1. Ventricular premature beats               Plan:       He is fine --PVCs are controlled     He had coronary calcium and is on statins.

## 2024-02-20 LAB
ADV 40+41 DNA STL QL NAA+NON-PROBE: NOT DETECTED
ASTRO TYP 1-8 RNA STL QL NAA+NON-PROBE: NOT DETECTED
C CAYETANENSIS DNA STL QL NAA+NON-PROBE: NOT DETECTED
C COLI+JEJ+UPSA DNA STL QL NAA+NON-PROBE: NOT DETECTED
C DIF TOX TCDA+TCDB STL QL NAA+NON-PROBE: NOT DETECTED
C DIFF TOX GENS STL QL NAA+PROBE: NEGATIVE
CRYPTOSP DNA STL QL NAA+NON-PROBE: NOT DETECTED
E COLI O157 DNA STL QL NAA+NON-PROBE: NORMAL
E HISTOLYT DNA STL QL NAA+NON-PROBE: NOT DETECTED
EAEC PAA PLAS AGGR+AATA ST NAA+NON-PRB: NOT DETECTED
EC STX1+STX2 GENES STL QL NAA+NON-PROBE: NOT DETECTED
EPEC EAE GENE STL QL NAA+NON-PROBE: NOT DETECTED
ETEC LTA+ST1A+ST1B TOX ST NAA+NON-PROBE: NOT DETECTED
G LAMBLIA DNA STL QL NAA+NON-PROBE: NOT DETECTED
NOROVIRUS GI+II RNA STL QL NAA+NON-PROBE: NOT DETECTED
O+P SPEC MICRO: NORMAL
O+P STL CONC: NORMAL
P SHIGELLOIDES DNA STL QL NAA+NON-PROBE: NOT DETECTED
RVA RNA STL QL NAA+NON-PROBE: NOT DETECTED
S ENT+BONG DNA STL QL NAA+NON-PROBE: NOT DETECTED
SAPO I+II+IV+V RNA STL QL NAA+NON-PROBE: NOT DETECTED
SHIGELLA SP+EIEC IPAH ST NAA+NON-PROBE: NOT DETECTED
V CHOL+PARA+VUL DNA STL QL NAA+NON-PROBE: NOT DETECTED
V CHOLERAE DNA STL QL NAA+NON-PROBE: NOT DETECTED
WBC STL QL MICRO: NORMAL
WBC STL QL MICRO: NORMAL
Y ENTEROCOL DNA STL QL NAA+NON-PROBE: NOT DETECTED

## 2024-03-04 ENCOUNTER — OFFICE VISIT (OUTPATIENT)
Dept: FAMILY MEDICINE CLINIC | Facility: CLINIC | Age: 64
End: 2024-03-04
Payer: COMMERCIAL

## 2024-03-04 VITALS
HEIGHT: 67 IN | HEART RATE: 66 BPM | SYSTOLIC BLOOD PRESSURE: 114 MMHG | BODY MASS INDEX: 22.76 KG/M2 | WEIGHT: 145 LBS | OXYGEN SATURATION: 98 % | DIASTOLIC BLOOD PRESSURE: 78 MMHG | TEMPERATURE: 97.6 F

## 2024-03-04 DIAGNOSIS — R19.7 DIARRHEA DUE TO MALABSORPTION: Primary | ICD-10-CM

## 2024-03-04 DIAGNOSIS — E73.9 LACTOSE INTOLERANCE DUE TO ACQUIRED LACTASE DEFICIENCY: ICD-10-CM

## 2024-03-04 DIAGNOSIS — K90.9 DIARRHEA DUE TO MALABSORPTION: Primary | ICD-10-CM

## 2024-03-04 PROCEDURE — 99213 OFFICE O/P EST LOW 20 MIN: CPT | Performed by: FAMILY MEDICINE

## 2024-03-04 NOTE — PROGRESS NOTES
"Chief Complaint  Diarrhea (Feeling much bettter no complains less bloating )    Subjective        Graeme Lopez presents to [facility]  Diarrhea     Pleasant 64-year-old male here for almost 2 months of soft stools with lots of gas, worsening odor.  At the last appointment evaluating for infection which was negative.  On Friday he had a alejandro tea with milk and realizes that dairy may be a component of his symptoms.  He is avoided dairy since then with substantial improvement    Objective   Vital Signs:  /78   Pulse 66   Temp 97.6 °F (36.4 °C)   Ht 170.2 cm (67\")   Wt 65.8 kg (145 lb)   SpO2 98%   BMI 22.71 kg/m²   Estimated body mass index is 22.71 kg/m² as calculated from the following:    Height as of this encounter: 170.2 cm (67\").    Weight as of this encounter: 65.8 kg (145 lb).       BMI is within normal parameters. No other follow-up for BMI required.      Physical Exam  Vitals and nursing note reviewed.   Constitutional:       General: He is not in acute distress.     Appearance: He is well-developed.   HENT:      Head: Normocephalic.      Nose: Nose normal.   Cardiovascular:      Heart sounds: No murmur heard.  Pulmonary:      Effort: Pulmonary effort is normal. No respiratory distress.   Musculoskeletal:         General: Normal range of motion.   Skin:     General: Skin is warm and dry.      Findings: No rash.   Neurological:      Mental Status: He is alert and oriented to person, place, and time.   Psychiatric:         Behavior: Behavior normal.         Thought Content: Thought content normal.         Judgment: Judgment normal.        Result Review :    The following data was reviewed by: Bianka Raymond MD on 03/04/2024:      Clostridioides difficile Toxin, PCR - Stool, Per Rectum (02/14/2024 14:09)  Ova & Parasite Examination - Stool, Per Rectum (02/14/2024 14:09)  Gastrointestinal Panel, PCR - Stool, Per Rectum (02/14/2024 14:09)  Fecal Leukocytes - Stool, " Per Rectum (02/14/2024 14:09)  Lipase (02/13/2024 10:55)  TSH Rfx On Abnormal To Free T4 (02/13/2024 10:55)  CBC & Differential (02/13/2024 10:55)  Comprehensive Metabolic Panel (02/13/2024 10:55)           Assessment and Plan     Diagnoses and all orders for this visit:    1. Diarrhea due to malabsorption (Primary)    2. Lactose intolerance due to acquired lactase deficiency    I do agree that his symptoms are likely related to lactose intolerance, he is already got some Lactaid pills and is avoiding lactose in his diet.  For now he would like to wait to consider food allergen panel especially as he has had RAST testing in the past.  Will follow-up at his physical to see if he wants to do any further investigation on the symptoms.         Follow Up     Return if symptoms worsen or fail to improve.  Patient was given instructions and counseling regarding his condition or for health maintenance advice. Please see specific information pulled into the AVS if appropriate.

## 2024-04-12 DIAGNOSIS — E78.5 HYPERLIPIDEMIA, UNSPECIFIED HYPERLIPIDEMIA TYPE: ICD-10-CM

## 2024-04-12 DIAGNOSIS — Z13.6 SCREENING FOR HEART DISEASE: Primary | ICD-10-CM

## 2024-04-12 DIAGNOSIS — Z13.1 SCREENING FOR DIABETES MELLITUS: ICD-10-CM

## 2024-04-18 LAB
ALBUMIN SERPL-MCNC: 4.3 G/DL (ref 3.5–5.2)
ALBUMIN/GLOB SERPL: 2.2 G/DL
ALP SERPL-CCNC: 99 U/L (ref 39–117)
ALT SERPL-CCNC: 27 U/L (ref 1–41)
AST SERPL-CCNC: 23 U/L (ref 1–40)
BASOPHILS # BLD AUTO: 0.07 10*3/MM3 (ref 0–0.2)
BASOPHILS NFR BLD AUTO: 0.9 % (ref 0–1.5)
BILIRUB SERPL-MCNC: 0.5 MG/DL (ref 0–1.2)
BUN SERPL-MCNC: 14 MG/DL (ref 8–23)
BUN/CREAT SERPL: 12.3 (ref 7–25)
CALCIUM SERPL-MCNC: 9.7 MG/DL (ref 8.6–10.5)
CHLORIDE SERPL-SCNC: 101 MMOL/L (ref 98–107)
CHOLEST SERPL-MCNC: 142 MG/DL (ref 0–200)
CO2 SERPL-SCNC: 28.4 MMOL/L (ref 22–29)
CREAT SERPL-MCNC: 1.14 MG/DL (ref 0.76–1.27)
EGFRCR SERPLBLD CKD-EPI 2021: 72.7 ML/MIN/1.73
EOSINOPHIL # BLD AUTO: 0.3 10*3/MM3 (ref 0–0.4)
EOSINOPHIL NFR BLD AUTO: 3.8 % (ref 0.3–6.2)
ERYTHROCYTE [DISTWIDTH] IN BLOOD BY AUTOMATED COUNT: 11.7 % (ref 12.3–15.4)
GLOBULIN SER CALC-MCNC: 2 GM/DL
GLUCOSE SERPL-MCNC: 94 MG/DL (ref 65–99)
HBA1C MFR BLD: 5.6 % (ref 4.8–5.6)
HCT VFR BLD AUTO: 42.1 % (ref 37.5–51)
HDLC SERPL-MCNC: 65 MG/DL (ref 40–60)
HGB BLD-MCNC: 14 G/DL (ref 13–17.7)
IMM GRANULOCYTES # BLD AUTO: 0.02 10*3/MM3 (ref 0–0.05)
IMM GRANULOCYTES NFR BLD AUTO: 0.3 % (ref 0–0.5)
LDLC SERPL CALC-MCNC: 64 MG/DL (ref 0–100)
LDLC/HDLC SERPL: 0.98 {RATIO}
LYMPHOCYTES # BLD AUTO: 1.92 10*3/MM3 (ref 0.7–3.1)
LYMPHOCYTES NFR BLD AUTO: 24.2 % (ref 19.6–45.3)
MCH RBC QN AUTO: 29 PG (ref 26.6–33)
MCHC RBC AUTO-ENTMCNC: 33.3 G/DL (ref 31.5–35.7)
MCV RBC AUTO: 87.3 FL (ref 79–97)
MONOCYTES # BLD AUTO: 0.91 10*3/MM3 (ref 0.1–0.9)
MONOCYTES NFR BLD AUTO: 11.5 % (ref 5–12)
NEUTROPHILS # BLD AUTO: 4.72 10*3/MM3 (ref 1.7–7)
NEUTROPHILS NFR BLD AUTO: 59.3 % (ref 42.7–76)
NRBC BLD AUTO-RTO: 0 /100 WBC (ref 0–0.2)
PLATELET # BLD AUTO: 282 10*3/MM3 (ref 140–450)
POTASSIUM SERPL-SCNC: 4.7 MMOL/L (ref 3.5–5.2)
PROT SERPL-MCNC: 6.3 G/DL (ref 6–8.5)
RBC # BLD AUTO: 4.82 10*6/MM3 (ref 4.14–5.8)
SODIUM SERPL-SCNC: 139 MMOL/L (ref 136–145)
TRIGL SERPL-MCNC: 65 MG/DL (ref 0–150)
VLDLC SERPL CALC-MCNC: 13 MG/DL (ref 5–40)
WBC # BLD AUTO: 7.94 10*3/MM3 (ref 3.4–10.8)

## 2024-04-22 ENCOUNTER — OFFICE VISIT (OUTPATIENT)
Dept: FAMILY MEDICINE CLINIC | Facility: CLINIC | Age: 64
End: 2024-04-22
Payer: COMMERCIAL

## 2024-04-22 ENCOUNTER — TELEPHONE (OUTPATIENT)
Dept: GASTROENTEROLOGY | Facility: CLINIC | Age: 64
End: 2024-04-22
Payer: COMMERCIAL

## 2024-04-22 VITALS
WEIGHT: 144 LBS | HEART RATE: 63 BPM | TEMPERATURE: 97.8 F | DIASTOLIC BLOOD PRESSURE: 78 MMHG | HEIGHT: 67 IN | BODY MASS INDEX: 22.6 KG/M2 | SYSTOLIC BLOOD PRESSURE: 118 MMHG | OXYGEN SATURATION: 99 %

## 2024-04-22 DIAGNOSIS — I25.10 CORONARY ARTERY DISEASE DUE TO LIPID RICH PLAQUE: ICD-10-CM

## 2024-04-22 DIAGNOSIS — G47.09 OTHER INSOMNIA: ICD-10-CM

## 2024-04-22 DIAGNOSIS — I25.83 CORONARY ARTERY DISEASE DUE TO LIPID RICH PLAQUE: ICD-10-CM

## 2024-04-22 DIAGNOSIS — Z00.00 HEALTH MAINTENANCE EXAMINATION: Primary | ICD-10-CM

## 2024-04-22 PROBLEM — B35.1 OM (ONYCHOMYCOSIS): Status: ACTIVE | Noted: 2024-04-22

## 2024-04-22 PROBLEM — M25.521 ARTHRALGIA OF RIGHT ELBOW: Status: ACTIVE | Noted: 2023-06-08

## 2024-04-22 PROBLEM — M77.11 LATERAL EPICONDYLITIS OF RIGHT ELBOW: Status: ACTIVE | Noted: 2023-11-21

## 2024-04-22 PROCEDURE — 99396 PREV VISIT EST AGE 40-64: CPT | Performed by: FAMILY MEDICINE

## 2024-04-22 PROCEDURE — 99213 OFFICE O/P EST LOW 20 MIN: CPT | Performed by: FAMILY MEDICINE

## 2024-04-22 RX ORDER — ATORVASTATIN CALCIUM 20 MG/1
20 TABLET, FILM COATED ORAL
Qty: 90 TABLET | Refills: 1 | Status: SHIPPED | OUTPATIENT
Start: 2024-04-22

## 2024-04-22 RX ORDER — TEMAZEPAM 15 MG/1
15 CAPSULE ORAL NIGHTLY PRN
Qty: 30 CAPSULE | Refills: 0 | Status: SHIPPED | OUTPATIENT
Start: 2024-04-22

## 2024-04-22 NOTE — PROGRESS NOTES
"Chief Complaint  Annual Exam (Doing well no complains ) insomnia    Subjective        Graeme Lopez presents to [facility] PR[facility]  History of Present Illness  Annual Exam.    Health Maintenance   Topic Date Due    RSV Vaccine - Adults (1 - 1-dose 60+ series) Never done    COVID-19 Vaccine (6 - 2023-24 season) 09/01/2023    ANNUAL PHYSICAL  04/10/2024    INFLUENZA VACCINE  08/01/2024    LIPID PANEL  04/17/2025    TDAP/TD VACCINES (2 - Td or Tdap) 06/17/2025    COLORECTAL CANCER SCREENING  10/10/2027    ZOSTER VACCINE  Completed    HEPATITIS C SCREENING  Addressed    Pneumococcal Vaccine 0-64  Aged Out       Diet: eating healthy   Exercise: doing great  Immunizations: Immunization   Colon cancer screening: UTD, having a GI apt with Dr Chambers   Sleep/mental health: doing well   Dentist: doing well - UTD  Vision: UTD  Derm: UTD - no concerns.     Sleep - trouble sleeping when traveling - he takes a rare temazepam     Objective   Vital Signs:  /78   Pulse 63   Temp 97.8 °F (36.6 °C)   Ht 170.2 cm (67\")   Wt 65.3 kg (144 lb)   SpO2 99%   BMI 22.55 kg/m²   Estimated body mass index is 22.55 kg/m² as calculated from the following:    Height as of this encounter: 170.2 cm (67\").    Weight as of this encounter: 65.3 kg (144 lb).       BMI is within normal parameters. No other follow-up for BMI required.      Physical Exam  Vitals and nursing note reviewed.   Constitutional:       General: He is not in acute distress.     Appearance: Normal appearance. He is well-developed.   HENT:      Head: Normocephalic.      Right Ear: Tympanic membrane and ear canal normal.      Left Ear: Tympanic membrane and ear canal normal.      Nose: Nose normal.   Cardiovascular:      Rate and Rhythm: Normal rate and regular rhythm.      Heart sounds: Normal heart sounds. No murmur heard.  Pulmonary:      Effort: Pulmonary effort is normal. No respiratory distress.      Breath sounds: Normal breath sounds. "   Abdominal:      General: Abdomen is flat. There is no distension.      Palpations: Abdomen is soft.      Tenderness: There is no abdominal tenderness.   Musculoskeletal:         General: Normal range of motion.   Skin:     General: Skin is warm and dry.      Findings: No rash.   Neurological:      Mental Status: He is alert and oriented to person, place, and time.   Psychiatric:         Mood and Affect: Mood normal.         Behavior: Behavior normal.         Thought Content: Thought content normal.         Judgment: Judgment normal.        Result Review :    The following data was reviewed by: Bianka Raymond MD on 04/22/2024:        Hemoglobin A1c (04/17/2024 11:45)  Lipid Panel With LDL / HDL Ratio (04/17/2024 11:45)  Comprehensive Metabolic Panel (04/17/2024 11:45)  CBC & Differential (04/17/2024 11:45)         Assessment and Plan     Diagnoses and all orders for this visit:    1. Health maintenance examination (Primary)    2. Other insomnia  -     temazepam (RESTORIL) 15 MG capsule; Take 1 capsule by mouth At Night As Needed for Sleep.  Dispense: 30 capsule; Refill: 0    3. Coronary artery disease due to lipid rich plaque  -     atorvastatin (LIPITOR) 20 MG tablet; Take 1 tablet by mouth every night at bedtime.  Dispense: 90 tablet; Refill: 1    Here for annual exam, fasting labs reviewed with patient which look great, immunizations up-to-date, colon cancer screening up-to-date but having EGD next week, cardiovascular screening normal blood pressure, excellent cholesterol, counseled patient to increase vegetable intake, water and 150 minutes of exercise weekly combining weightbearing exercises and aerobic activity.    Okay to continue using temazepam rarely for sleep especially when traveling, a 30 tablet supply last time a year.    Hyperlipidemia also very well-controlled on atorvastatin, concern for CAD treatment as above       Follow Up     Return in about 1 year (around 4/22/2025), or if symptoms worsen or  fail to improve, for Annual Physical with fasting labs prior.  Patient was given instructions and counseling regarding his condition or for health maintenance advice. Please see specific information pulled into the AVS if appropriate.

## 2024-04-22 NOTE — TELEPHONE ENCOUNTER
----- Message from Graeme Lopez sent at 4/22/2024 12:46 PM EDT -----  Regarding: Appointment Request  Contact: [phone]  Appointment Request From: Graeme Lopez    With Provider: Rishi Chambers [[facility] GASTROENTEROLOGY]    Preferred Date Range: 5/6/2024 – 5/31/2024    Preferred Times: Any Time    Reason for visit: Follow-up    Comments:  Chronic loose/soft stools

## 2024-04-29 ENCOUNTER — OUTSIDE FACILITY SERVICE (OUTPATIENT)
Dept: GASTROENTEROLOGY | Facility: CLINIC | Age: 64
End: 2024-04-29
Payer: COMMERCIAL

## 2024-04-29 ENCOUNTER — LAB REQUISITION (OUTPATIENT)
Dept: LAB | Facility: HOSPITAL | Age: 64
End: 2024-04-29
Payer: COMMERCIAL

## 2024-04-29 DIAGNOSIS — K20.0 EOSINOPHILIC ESOPHAGITIS: ICD-10-CM

## 2024-04-29 PROCEDURE — 88305 TISSUE EXAM BY PATHOLOGIST: CPT | Performed by: INTERNAL MEDICINE

## 2024-04-30 ENCOUNTER — OFFICE VISIT (OUTPATIENT)
Dept: GASTROENTEROLOGY | Facility: CLINIC | Age: 64
End: 2024-04-30
Payer: COMMERCIAL

## 2024-04-30 VITALS
WEIGHT: 146 LBS | TEMPERATURE: 96.3 F | HEIGHT: 67 IN | BODY MASS INDEX: 22.91 KG/M2 | DIASTOLIC BLOOD PRESSURE: 87 MMHG | SYSTOLIC BLOOD PRESSURE: 134 MMHG | HEART RATE: 73 BPM | OXYGEN SATURATION: 97 %

## 2024-04-30 DIAGNOSIS — K20.0 ESOPHAGITIS, EOSINOPHILIC: ICD-10-CM

## 2024-04-30 DIAGNOSIS — R19.8 CHANGE IN BOWEL FUNCTION: ICD-10-CM

## 2024-04-30 DIAGNOSIS — K21.9 GASTROESOPHAGEAL REFLUX DISEASE, UNSPECIFIED WHETHER ESOPHAGITIS PRESENT: Primary | ICD-10-CM

## 2024-04-30 LAB
LAB AP CASE REPORT: NORMAL
LAB AP DIAGNOSIS COMMENT: NORMAL
PATH REPORT.FINAL DX SPEC: NORMAL
PATH REPORT.GROSS SPEC: NORMAL

## 2024-04-30 PROCEDURE — 99214 OFFICE O/P EST MOD 30 MIN: CPT | Performed by: INTERNAL MEDICINE

## 2024-04-30 NOTE — PROGRESS NOTES
Chief Complaint   Patient presents with    Heartburn     gerd    Diarrhea        Graeme Lopez is a  64 y.o. male here for a follow up visit for GERD, change in bowel function, eosinophilic esophagitis, dysphagia    HPI this patient was just seen within the last 24 hours for upper endoscopic evaluation due to his history of esophageal stricture, eosinophilic esophagitis.  He mentioned prior to that examination having change in bowel pattern is presenting at this time for further evaluation of that condition.  He describes routine bowel pattern is usually once daily after stimulation with caffeine in the morning.  For the past several months he has had bowel movements that range from formed to loose and occasional bout of diarrhea.  He tried to adjust for lactose intolerance with variable success.  We talked about other contributing factors and he has not been on any routine antibiotic or changed diet dramatically.  His primary care tender did test for GI panel, O&P, C. difficile toxin assay, and C&S with essentially negative findings.  His weight has been relatively stable.  We talked about further evaluation to include malabsorption assessment i.e. celiac panel, bile acid assay, and hydrogen breath testing but will first ask him to adjust his diet and contact with a progress report in the next 2 to 3 weeks.  He has initiated a fiber supplement and will continue this.    Past Medical History:   Diagnosis Date    Acute URI     Adhesive capsulitis of shoulder     left shoulder pain    Allergic 1995    Sulfa    Chronic prostatitis     Colon polyp 2015    Removed during colonoscopy    Eosinophilic esophagitis     Noted on EGD biopsy February 11, 2016 by Dr. Chambers    Esophageal stricture     Dilated September 2016    Eustachian tube dysfunction     GERD (gastroesophageal reflux disease)     Hypercholesteremia     Lactose intolerance     Low back pain     Lumbar radiculopathy     Lumbar spondylosis     Palpitations      Partial tear of common extensor tendon of right elbow     Plantar fasciitis     Premature ventricular contraction     Skin cancer     Stye     right    Tubular adenoma of colon     Colonoscopy 2014-Normal       Current Outpatient Medications   Medication Sig Dispense Refill    atorvastatin (LIPITOR) 20 MG tablet Take 1 tablet by mouth every night at bedtime. 90 tablet 1    Calcium Citrate-Vitamin D (CALCIUM + D PO) Take  by mouth.      EPINEPHrine (EpiPen 2-Yogi) 0.3 MG/0.3ML solution auto-injector injection Inject 0.3 mL into the appropriate muscle as directed by prescriber 1 (One) Time As Needed (allergic reaction/anaphylaxis) for up to 1 dose. 1 each 0    meloxicam (MOBIC) 15 MG tablet Take 1 tablet by mouth Daily As Needed.      Multiple Vitamin (MULTI VITAMIN DAILY PO) Take by mouth.      omeprazole (priLOSEC) 40 MG capsule Take 1 capsule by mouth Daily As Needed.      temazepam (RESTORIL) 15 MG capsule Take 1 capsule by mouth At Night As Needed for Sleep. 30 capsule 0     No current facility-administered medications for this visit.       PRN Meds:.    Allergies   Allergen Reactions    Sulfa Antibiotics Rash       Social History     Socioeconomic History    Marital status:     Number of children: 1   Tobacco Use    Smoking status: Never    Smokeless tobacco: Never   Vaping Use    Vaping status: Never Used   Substance and Sexual Activity    Alcohol use: Yes     Alcohol/week: 3.0 standard drinks of alcohol     Types: 3 Drinks containing 0.5 oz of alcohol per week     Comment: occ    Drug use: No    Sexual activity: Yes     Partners: Female     Birth control/protection: Post-menopausal       Family History   Problem Relation Age of Onset    Atrial fibrillation Mother     Hyperlipidemia Mother     Hypertension Mother     Pancreatic cancer Mother 82    Hearing loss Mother     Thyroid disease Mother     Heart disease Mother         Atrial fibrillation    Heart failure Father     Coronary artery disease  Father         CABG X 3, and [id number]    Cancer Father         Skin    Heart disease Father         Coronary artery disease    Hyperlipidemia Father     Heart attack Father 53    No Known Problems Sister     No Known Problems Daughter     Ovarian cancer Maternal Grandmother     Alcohol abuse Maternal Grandfather        Review of Systems   Constitutional:  Negative for activity change, appetite change, fatigue and unexpected weight change.   HENT:  Negative for congestion, facial swelling, sore throat, trouble swallowing and voice change.    Eyes:  Negative for photophobia and visual disturbance.   Respiratory:  Negative for cough and choking.    Cardiovascular:  Negative for chest pain.   Gastrointestinal:  Positive for diarrhea. Negative for abdominal distention, abdominal pain, anal bleeding, blood in stool, constipation, nausea, rectal pain and vomiting.        Change in bowel function   Endocrine: Negative for polyphagia.   Musculoskeletal:  Negative for arthralgias, gait problem and joint swelling.   Skin:  Negative for color change, pallor and rash.   Allergic/Immunologic: Negative for food allergies.   Neurological:  Negative for speech difficulty and headaches.   Hematological:  Does not bruise/bleed easily.   Psychiatric/Behavioral:  Negative for agitation, confusion and sleep disturbance.        Vitals:    04/30/24 0937   BP: 134/87   Pulse: 73   Temp: 96.3 °F (35.7 °C)   SpO2: 97%       Physical Exam  Constitutional:       Appearance: He is well-developed.   HENT:      Head: Normocephalic.   Eyes:      Conjunctiva/sclera: Conjunctivae normal.   Cardiovascular:      Rate and Rhythm: Normal rate and regular rhythm.   Pulmonary:      Breath sounds: Normal breath sounds.   Abdominal:      General: Bowel sounds are normal.      Palpations: Abdomen is soft.   Musculoskeletal:         General: Normal range of motion.      Cervical back: Normal range of motion.   Skin:     General: Skin is warm and dry.    Neurological:      Mental Status: He is alert and oriented to person, place, and time.   Psychiatric:         Behavior: Behavior normal.         ASSESSMENT   #1 GERD  #2 dysphagia  #3 eosinophilic esophagitis  #4 change in bowel pattern      PLAN  Test for lactose intolerance with dietary adjustment  Continue fiber supplement  Call with progress report in 3 to 4 weeks  Consider more formal testing pending his response  Await biopsy results  Continue PPI      ICD-10-CM ICD-9-CM   1. Gastroesophageal reflux disease, unspecified whether esophagitis present  K21.9 530.81   2. Change in bowel function  R19.8 787.99   3. Esophagitis, eosinophilic  K20.0 530.13

## 2024-05-07 ENCOUNTER — TELEPHONE (OUTPATIENT)
Dept: GASTROENTEROLOGY | Facility: CLINIC | Age: 64
End: 2024-05-07
Payer: COMMERCIAL

## 2024-07-31 ENCOUNTER — TELEPHONE (OUTPATIENT)
Dept: GASTROENTEROLOGY | Facility: CLINIC | Age: 64
End: 2024-07-31
Payer: COMMERCIAL

## 2024-07-31 DIAGNOSIS — R19.7 DIARRHEA, UNSPECIFIED TYPE: Primary | ICD-10-CM

## 2024-07-31 NOTE — TELEPHONE ENCOUNTER
"----- Message from Rishi Chambers sent at 7/31/2024  2:19 PM EDT -----  Regarding: FW: Follow-Up to 4/30/24 Visit (change in bowel pattern)  Contact: [phone]  Would offer malabsorptive workup to include celiac panel, bile acid assay, and possible hydrogen breath testing (if these have not been performed within the last 2 years.)  ----- Message -----  From: Trista Tran RN  Sent: 7/31/2024   7:45 AM EDT  To: Rishi PEREZ MD  Subject: FW: Follow-Up to 4/30/24 Visit (change in milka#      ----- Message -----  From: Graeme Lopez \"Duane\"  Sent: 7/30/2024   4:41 PM EDT  To: [facility]  Subject: Follow-Up to 4/30/24 Visit (change in bowel #    Dr. Chambers,  I continue to struggle with frequent gas as well as soft + loose stool. I’ve tried multiple diet adjustments with limited success:  -Continued with no milk, reduced dairy, use of lactase tablets with dairy  -Continued with daily fiber supplement  -Stopped drinking caffeinated coffee for 1 week - no noticeable change  -Stopped citrus fruits (oranges, chen, limes, grapefruit) for 2 weeks - no noticeable change  -Stopped fruit smoothies for 2 weeks – no noticeable change. Smoothie is usually some combination of banana, strawberries, cherries, peach, pineapple, casie plus peanut butter, protein powder (gluten free) and oat milk.  -Started a gluten-free diet 7/8/24 which initially showed improvement. However, the improvement seems to be diminishing.  Do you have any suggestions?  Thank you,  Duane Lopez  "
I have called the pt and passed on Dr Chambers's recommendations and the pt verbalized understanding.    SIBO kit ordered, and orders placed for labs.  Pt will go by the hospital to have these done  
back

## 2024-08-01 ENCOUNTER — LAB (OUTPATIENT)
Dept: LAB | Facility: HOSPITAL | Age: 64
End: 2024-08-01
Payer: COMMERCIAL

## 2024-08-01 DIAGNOSIS — R19.7 DIARRHEA, UNSPECIFIED TYPE: ICD-10-CM

## 2024-08-01 PROCEDURE — 86364 TISS TRNSGLTMNASE EA IG CLAS: CPT | Performed by: INTERNAL MEDICINE

## 2024-08-01 PROCEDURE — 86231 EMA EACH IG CLASS: CPT | Performed by: INTERNAL MEDICINE

## 2024-08-01 PROCEDURE — 82542 COL CHROMOTOGRAPHY QUAL/QUAN: CPT

## 2024-08-01 PROCEDURE — 36415 COLL VENOUS BLD VENIPUNCTURE: CPT

## 2024-08-01 PROCEDURE — 86258 DGP ANTIBODY EACH IG CLASS: CPT | Performed by: INTERNAL MEDICINE

## 2024-08-01 PROCEDURE — 82784 ASSAY IGA/IGD/IGG/IGM EACH: CPT | Performed by: INTERNAL MEDICINE

## 2024-08-03 LAB
ENDOMYSIUM IGA SER QL: NEGATIVE
GLIADIN PEPTIDE IGA SER-ACNC: 2 UNITS (ref 0–19)
GLIADIN PEPTIDE IGG SER-ACNC: 2 UNITS (ref 0–19)
IGA SERPL-MCNC: 37 MG/DL (ref 61–437)
TTG IGA SER-ACNC: <2 U/ML (ref 0–3)
TTG IGG SER-ACNC: <2 U/ML (ref 0–5)

## 2024-08-09 ENCOUNTER — TELEPHONE (OUTPATIENT)
Dept: GASTROENTEROLOGY | Facility: CLINIC | Age: 64
End: 2024-08-09
Payer: COMMERCIAL

## 2024-08-09 NOTE — TELEPHONE ENCOUNTER
----- Message from Rishi Chambers sent at 8/8/2024  5:01 PM EDT -----  Regarding: Celiac panel results  Okay to notify patient celiac panel was negative, awaiting bile acid assay results.  ----- Message -----  From: Lab, Background User  Sent: 8/3/2024   4:10 PM EDT  To: Rishi PEREZ MD

## 2024-08-12 ENCOUNTER — TELEPHONE (OUTPATIENT)
Dept: GASTROENTEROLOGY | Facility: CLINIC | Age: 64
End: 2024-08-12
Payer: COMMERCIAL

## 2024-08-12 LAB — REF LAB TEST METHOD: NORMAL

## 2024-08-14 ENCOUNTER — TELEPHONE (OUTPATIENT)
Dept: GASTROENTEROLOGY | Facility: CLINIC | Age: 64
End: 2024-08-14

## 2024-08-14 NOTE — TELEPHONE ENCOUNTER
Called pt and advised of Dr Chambers's note. Verb understanding.       Pt reports that we have already ordered the sibo test and he is waiting for the test to be delivered.

## 2024-08-14 NOTE — TELEPHONE ENCOUNTER
Salma notify patient bile acid assay was negative.  With negative celiac panel and negative bile acid assay, if he is still symptomatic can offer a hydrogen breath test to rule out bacterial overgrowth.

## 2024-11-14 ENCOUNTER — PATIENT MESSAGE (OUTPATIENT)
Dept: FAMILY MEDICINE CLINIC | Facility: CLINIC | Age: 64
End: 2024-11-14
Payer: COMMERCIAL

## 2024-11-14 DIAGNOSIS — I25.83 CORONARY ARTERY DISEASE DUE TO LIPID RICH PLAQUE: ICD-10-CM

## 2024-11-14 DIAGNOSIS — I25.10 CORONARY ARTERY DISEASE DUE TO LIPID RICH PLAQUE: ICD-10-CM

## 2024-11-14 DIAGNOSIS — T75.3XXD MOTION SICKNESS, SUBSEQUENT ENCOUNTER: Primary | ICD-10-CM

## 2024-11-14 RX ORDER — ATORVASTATIN CALCIUM 20 MG/1
20 TABLET, FILM COATED ORAL
Qty: 90 TABLET | Refills: 1 | Status: SHIPPED | OUTPATIENT
Start: 2024-11-14

## 2024-11-15 RX ORDER — SCOLOPAMINE TRANSDERMAL SYSTEM 1 MG/1
1 PATCH, EXTENDED RELEASE TRANSDERMAL
Qty: 6 EACH | Refills: 1 | Status: SHIPPED | OUTPATIENT
Start: 2024-11-15

## 2024-12-11 ENCOUNTER — OFFICE VISIT (OUTPATIENT)
Dept: FAMILY MEDICINE CLINIC | Facility: CLINIC | Age: 64
End: 2024-12-11
Payer: COMMERCIAL

## 2024-12-11 VITALS
OXYGEN SATURATION: 98 % | HEIGHT: 67 IN | TEMPERATURE: 97.7 F | SYSTOLIC BLOOD PRESSURE: 118 MMHG | BODY MASS INDEX: 22.6 KG/M2 | HEART RATE: 62 BPM | WEIGHT: 144 LBS | DIASTOLIC BLOOD PRESSURE: 74 MMHG

## 2024-12-11 DIAGNOSIS — M54.2 NECK PAIN: Primary | ICD-10-CM

## 2024-12-11 PROCEDURE — 99213 OFFICE O/P EST LOW 20 MIN: CPT | Performed by: NURSE PRACTITIONER

## 2024-12-11 RX ORDER — CYCLOBENZAPRINE HCL 5 MG
5 TABLET ORAL 3 TIMES DAILY PRN
Qty: 30 TABLET | Refills: 0 | Status: SHIPPED | OUTPATIENT
Start: 2024-12-11

## 2024-12-11 NOTE — PROGRESS NOTES
"Chief Complaint  Neck Pain (Posterior L neck pain ~4-5M NKI Intermittent sharp pain 6/10 when turning head, 0/10 at present. Taking Meloxicam and Advil PRN )    Subjective        Graeme Lopez presents to [facility]    Neck pain when turning head, especially to the left. Not all the time.   Symptoms are: recurrent.   Episode onset: 6 months ago.   Symptoms occur: daily.  Symptoms include: neck pain.   Pertinent negative symptoms include no abdominal pain, no anorexia, no joint pain, no change in stool, no chest pain, no chills, no congestion, no cough, no diaphoresis, no fatigue, no fever, no headaches, no joint swelling, no myalgias, no nausea, no numbness, no rash, no sore throat, no swollen glands, no dysuria, no vertigo, no visual change, no vomiting and no weakness.   Symptoms comment: some tingling in the arm, but relates this to past shoulder impingement.   Treatments tried include NSAIDs (Working on posture.).   Additional information: No trauma or injury. He does do indoor golf lessons.     Objective   Vital Signs:  /74   Pulse 62   Temp 97.7 °F (36.5 °C)   Ht 170.2 cm (67.01\")   Wt 65.3 kg (144 lb)   SpO2 98%   BMI 22.55 kg/m²   Estimated body mass index is 22.55 kg/m² as calculated from the following:    Height as of this encounter: 170.2 cm (67.01\").    Weight as of this encounter: 65.3 kg (144 lb).    BMI is within normal parameters. No other follow-up for BMI required.      Physical Exam  Vitals reviewed.   Constitutional:       General: He is not in acute distress.     Appearance: Normal appearance. He is not ill-appearing, toxic-appearing or diaphoretic.   HENT:      Head: Normocephalic and atraumatic.   Eyes:      General: No scleral icterus.        Right eye: No discharge.         Left eye: No discharge.      Extraocular Movements: Extraocular movements intact.   Pulmonary:      Effort: Pulmonary effort is normal. No respiratory distress. "   Musculoskeletal:      Cervical back: No tenderness or bony tenderness. No pain with movement.      Comments: Negative Spurling maneuver    Neurological:      General: No focal deficit present.      Mental Status: He is alert and oriented to person, place, and time.      Motor: No weakness.      Gait: Gait normal.   Psychiatric:         Mood and Affect: Mood normal.         Behavior: Behavior normal.        Result Review :                Assessment and Plan   Diagnoses and all orders for this visit:    1. Neck pain (Primary)  -     XR Spine Cervical Complete 4 or 5 View  -     cyclobenzaprine (FLEXERIL) 5 MG tablet; Take 1 tablet by mouth 3 (Three) Times a Day As Needed for Muscle Spasms.  Dispense: 30 tablet; Refill: 0  -     Cancel: Ambulatory Referral to Physical Therapy for Evaluation & Treatment  -     Ambulatory Referral to Physical Therapy for Evaluation & Treatment      Given length of symptoms, and intermittent radicular symptoms, cervical spine x-ray ordered, degeneration noted. He does have some muscular tension.  Recommended PT, alternating ice and heat, NSAIDs, and muscle relaxers as needed.  He is aware the muscle relaxers can cause drowsiness.  Follow-up with PCP in 6 weeks if symptoms not improving or worsen.         Follow Up   Return if symptoms worsen or fail to improve.  Patient was given instructions and counseling regarding his condition or for health maintenance advice. Please see specific information pulled into the AVS if appropriate.       Electronically signed by ZENOBIA Jason, 12/14/24, 10:29 AM EST.

## 2025-03-05 ENCOUNTER — OFFICE VISIT (OUTPATIENT)
Dept: FAMILY MEDICINE CLINIC | Facility: CLINIC | Age: 65
End: 2025-03-05
Payer: COMMERCIAL

## 2025-03-05 VITALS
SYSTOLIC BLOOD PRESSURE: 126 MMHG | HEIGHT: 67 IN | HEART RATE: 75 BPM | BODY MASS INDEX: 22.91 KG/M2 | TEMPERATURE: 98 F | DIASTOLIC BLOOD PRESSURE: 82 MMHG | WEIGHT: 146 LBS | OXYGEN SATURATION: 98 %

## 2025-03-05 DIAGNOSIS — N41.1 CHRONIC PROSTATITIS: Primary | ICD-10-CM

## 2025-03-05 LAB
BILIRUB BLD-MCNC: NEGATIVE MG/DL
CLARITY, POC: CLEAR
COLOR UR: YELLOW
EXPIRATION DATE: NORMAL
GLUCOSE UR STRIP-MCNC: NEGATIVE MG/DL
KETONES UR QL: NEGATIVE
LEUKOCYTE EST, POC: NEGATIVE
Lab: NORMAL
NITRITE UR-MCNC: NEGATIVE MG/ML
PH UR: 6 [PH] (ref 5–8)
PROT UR STRIP-MCNC: NEGATIVE MG/DL
RBC # UR STRIP: NEGATIVE /UL
SP GR UR: 1.01 (ref 1–1.03)
UROBILINOGEN UR QL: NORMAL

## 2025-03-05 PROCEDURE — 81003 URINALYSIS AUTO W/O SCOPE: CPT | Performed by: NURSE PRACTITIONER

## 2025-03-05 PROCEDURE — 99213 OFFICE O/P EST LOW 20 MIN: CPT | Performed by: NURSE PRACTITIONER

## 2025-03-05 RX ORDER — CIPROFLOXACIN 500 MG/1
500 TABLET, FILM COATED ORAL 2 TIMES DAILY
Qty: 20 TABLET | Refills: 0 | Status: SHIPPED | OUTPATIENT
Start: 2025-03-05 | End: 2025-03-15

## 2025-03-05 NOTE — PROGRESS NOTES
"Chief Complaint  Prostate Check (Pt concerned for prostatitis due to new onset prostate constant dull discomfort  2/10 ~10D. Reports hx of prostatitis intermittently 1997. Previously followed w  at was prescribed meloxicam. Denies fever. Most recent PSA 7/2024 0.91 )    Subjective        Graeme Lopez presents to [facility]    Prodtate pain/discomfort. Suspect prostititis  Symptoms are: new.   Onset was in the past 7 days.   Symptoms occur: constantly.  Pertinent negative symptoms include no abdominal pain, no anorexia, no joint pain, no change in stool, no chest pain, no chills, no congestion, no cough, no diaphoresis, no fatigue, no fever, no headaches, no joint swelling, no myalgias, no nausea, no neck pain, no numbness, no rash, no sore throat, no swollen glands, no dysuria, no vertigo, no visual change, no vomiting and no weakness.   He has been ciprofloxacin and doxycycline in the past for prostatitis. It has been 4-5 years since last infection. No fever or chills. He has discomfort in the groin area. No testicular swelling. He has noticed some reduced urine stream. No hematuria. No new sexual partners.     Objective   Vital Signs:  /82   Pulse 75   Temp 98 °F (36.7 °C)   Ht 170.2 cm (67.01\")   Wt 66.2 kg (146 lb)   SpO2 98%   BMI 22.86 kg/m²   Estimated body mass index is 22.86 kg/m² as calculated from the following:    Height as of this encounter: 170.2 cm (67.01\").    Weight as of this encounter: 66.2 kg (146 lb).    BMI is within normal parameters. No other follow-up for BMI required.      Physical Exam  Vitals reviewed. Exam conducted with a chaperone present (JAJA Draper).   Constitutional:       General: He is not in acute distress.     Appearance: Normal appearance. He is not ill-appearing, toxic-appearing or diaphoretic.   HENT:      Head: Normocephalic and atraumatic.   Eyes:      General: No scleral icterus.        Right eye: No discharge.        "  Left eye: No discharge.      Extraocular Movements: Extraocular movements intact.   Pulmonary:      Effort: Pulmonary effort is normal. No respiratory distress.   Genitourinary:     Prostate: Enlarged and tender.   Neurological:      General: No focal deficit present.      Mental Status: He is oriented to person, place, and time.      Motor: No weakness.      Gait: Gait normal.   Psychiatric:         Mood and Affect: Mood normal.         Behavior: Behavior normal.        Result Review :      Data reviewed : Last urology note    Brief Urine Lab Results  (Last result in the past 365 days)        Color   Clarity   Blood   Leuk Est   Nitrite   Protein   CREAT   Urine HCG        03/05/25 1328 Yellow   Clear   Negative   Negative   Negative   Negative                         Assessment and Plan   Diagnoses and all orders for this visit:    1. Chronic prostatitis (Primary)  -     POCT urinalysis dipstick, automated  -     ciprofloxacin (Cipro) 500 MG tablet; Take 1 tablet by mouth 2 (Two) Times a Day for 10 days.  Dispense: 20 tablet; Refill: 0      Urinalysis normal.  Prostate exam revealed enlarged and tender prostate.  Ciprofloxacin prescribed.  Patient aware of potential adverse side effects.  Patient will follow-up if symptoms persist.  I did recommend continuing yearly PSA screening.         Follow Up   Return if symptoms worsen or fail to improve.  Patient was given instructions and counseling regarding his condition or for health maintenance advice. Please see specific information pulled into the AVS if appropriate.     Electronically signed by ZENOBIA Jason, 03/05/25, 1:39 PM EST.

## 2025-04-17 ENCOUNTER — OFFICE VISIT (OUTPATIENT)
Dept: FAMILY MEDICINE CLINIC | Facility: CLINIC | Age: 65
End: 2025-04-17
Payer: COMMERCIAL

## 2025-04-17 VITALS
DIASTOLIC BLOOD PRESSURE: 80 MMHG | SYSTOLIC BLOOD PRESSURE: 100 MMHG | HEIGHT: 67 IN | OXYGEN SATURATION: 97 % | TEMPERATURE: 98.6 F | BODY MASS INDEX: 22.91 KG/M2 | HEART RATE: 77 BPM | WEIGHT: 146 LBS | RESPIRATION RATE: 16 BRPM

## 2025-04-17 DIAGNOSIS — M79.671 ACUTE PAIN OF RIGHT FOOT: ICD-10-CM

## 2025-04-17 DIAGNOSIS — R68.89 FLU-LIKE SYMPTOMS: Primary | ICD-10-CM

## 2025-04-17 DIAGNOSIS — J06.9 UPPER RESPIRATORY TRACT INFECTION, UNSPECIFIED TYPE: ICD-10-CM

## 2025-04-17 DIAGNOSIS — J98.01 BRONCHOSPASM: ICD-10-CM

## 2025-04-17 LAB
EXPIRATION DATE: NORMAL
FLUAV AG UPPER RESP QL IA.RAPID: NOT DETECTED
FLUBV AG UPPER RESP QL IA.RAPID: NOT DETECTED
INTERNAL CONTROL: NORMAL
Lab: NORMAL
SARS-COV-2 AG UPPER RESP QL IA.RAPID: NOT DETECTED

## 2025-04-17 PROCEDURE — 87428 SARSCOV & INF VIR A&B AG IA: CPT | Performed by: FAMILY MEDICINE

## 2025-04-17 PROCEDURE — 99214 OFFICE O/P EST MOD 30 MIN: CPT | Performed by: FAMILY MEDICINE

## 2025-04-17 RX ORDER — AMOXICILLIN 875 MG/1
875 TABLET, COATED ORAL 2 TIMES DAILY
Qty: 20 TABLET | Refills: 0 | Status: SHIPPED | OUTPATIENT
Start: 2025-04-17 | End: 2025-04-27

## 2025-04-17 RX ORDER — DEXTROMETHORPHAN HYDROBROMIDE AND PROMETHAZINE HYDROCHLORIDE 15; 6.25 MG/5ML; MG/5ML
5 SYRUP ORAL 4 TIMES DAILY PRN
Qty: 118 ML | Refills: 0 | Status: SHIPPED | OUTPATIENT
Start: 2025-04-17

## 2025-04-17 RX ORDER — PREDNISONE 20 MG/1
40 TABLET ORAL DAILY
Qty: 10 TABLET | Refills: 0 | Status: SHIPPED | OUTPATIENT
Start: 2025-04-17 | End: 2025-04-22

## 2025-04-17 NOTE — PROGRESS NOTES
Subjective     Graeme Lopez is a 65 y.o. male.     Chief Complaint   Patient presents with    Fever     Body aches, weakness, fatigue since 4/10    Nasal Congestion    Cough    Foot Pain     Stabbing intermittent right foot x 2 days.        History of Present Illness     He is c/o Fever, body aches , cough, congestion , feeling fatigue for > 1 week  Has yellow green nasal drainage       Also c/o Rt foot pain for 2 days, sharp pain , last for less 2 minutes , affecting mainly the base of the RT big toe.  He is concerning for gout. Never had gout before   Dose not drinks alcohol much . Eats fish and chicken , not eating much meat  No redness or  swelling  No h/o fall or trauma     .    The following portions of the patient's history were reviewed and updated as appropriate: allergies, current medications, past family history, past medical history, past social history, past surgical history, and problem list.        Review of Systems   Constitutional:  Positive for fever.   HENT:  Positive for congestion.    Respiratory:  Positive for cough.        Vitals:    04/17/25 1618   BP: 100/80   Pulse: 77   Resp: 16   Temp: 98.6 °F (37 °C)   SpO2: 97%           04/17/25  1618   Weight: 66.2 kg (146 lb)         Body mass index is 22.86 kg/m².      Current Outpatient Medications   Medication Sig Dispense Refill    atorvastatin (LIPITOR) 20 MG tablet TAKE 1 TABLET BY MOUTH EVERYDAY AT BEDTIME 90 tablet 1    Calcium Citrate-Vitamin D (CALCIUM + D PO) Take  by mouth.      cyclobenzaprine (FLEXERIL) 5 MG tablet Take 1 tablet by mouth 3 (Three) Times a Day As Needed for Muscle Spasms. 30 tablet 0    EPINEPHrine (EpiPen 2-Yogi) 0.3 MG/0.3ML solution auto-injector injection Inject 0.3 mL into the appropriate muscle as directed by prescriber 1 (One) Time As Needed (allergic reaction/anaphylaxis) for up to 1 dose. 1 each 0    meloxicam (MOBIC) 15 MG tablet Take 1 tablet by mouth Daily As Needed.      Multiple Vitamin (MULTI VITAMIN  DAILY PO) Take by mouth.      omeprazole (priLOSEC) 40 MG capsule Take 1 capsule by mouth Daily As Needed.      temazepam (RESTORIL) 15 MG capsule Take 1 capsule by mouth At Night As Needed for Sleep. 30 capsule 0    amoxicillin (AMOXIL) 875 MG tablet Take 1 tablet by mouth 2 (Two) Times a Day for 10 days. 20 tablet 0    predniSONE (DELTASONE) 20 MG tablet Take 2 tablets by mouth Daily for 5 days. 10 tablet 0    promethazine-dextromethorphan (PROMETHAZINE-DM) 6.25-15 MG/5ML syrup Take 5 mL by mouth 4 (Four) Times a Day As Needed for Cough. 118 mL 0     No current facility-administered medications for this visit.                Objective   Physical Exam  Vitals and nursing note reviewed.   Constitutional:       General: He is not in acute distress.  HENT:      Nose: Congestion and rhinorrhea present.   Cardiovascular:      Rate and Rhythm: Normal rate and regular rhythm.      Heart sounds: Normal heart sounds. No murmur heard.  Pulmonary:      Effort: Pulmonary effort is normal. No respiratory distress.      Breath sounds: No stridor. Wheezing present. No rhonchi.   Musculoskeletal:         General: No swelling, tenderness or deformity. Normal range of motion.   Neurological:      Mental Status: He is alert and oriented to person, place, and time.   Psychiatric:         Mood and Affect: Mood normal.         Behavior: Behavior normal.         Thought Content: Thought content normal.           Assessment & Plan   Diagnoses and all orders for this visit:    1. Flu-like symptoms (Primary)  Comments:  all neg  Orders:  -     POCT SARS-CoV-2 Antigen MONSTER + Flu    2. Upper respiratory tract infection, unspecified type  Comments:  Discussed supportive care , plenty of fluids  Orders:  -     promethazine-dextromethorphan (PROMETHAZINE-DM) 6.25-15 MG/5ML syrup; Take 5 mL by mouth 4 (Four) Times a Day As Needed for Cough.  Dispense: 118 mL; Refill: 0  -     amoxicillin (AMOXIL) 875 MG tablet; Take 1 tablet by mouth 2 (Two) Times  a Day for 10 days.  Dispense: 20 tablet; Refill: 0    3. Bronchospasm  -     predniSONE (DELTASONE) 20 MG tablet; Take 2 tablets by mouth Daily for 5 days.  Dispense: 10 tablet; Refill: 0    4. Acute pain of right foot  Comments:  mainly the base of th big toe  exam benign, no redness or swelling less concern for Gout   advised to get Uric acid check in 1 week  consider pod if no better          I spent 34 minutes caring for this patient on this date of service. This time includes time spent by me in the following activities:preparing for the visit,reviewing previous medical records,  performing a medically appropriate examination and/or evaluation, counseling and educating the patient/family/caregiver, and documenting information in the medical record.       Patient was given instructions and counseling regarding her condition or for health maintenance advice. Please see specific information pulled into the AVS if appropriate.       I have fully discussed the nature of the medical condition(s) risks, complications, management, safe and proper use of medications.   Pt stated no allergy to the above prescribed medication.  I have discussed the SIDE EFFECT OF MEDICATION and importance TO report any side effect , the patient expressed good understanding.  Encouraged medication compliance and the importance of keeping scheduled follow up appointments with me and any other providers.    Patient instructed to follow up with our office for results on any labs/imaging ordered during this visit.    Home care discussed  All questions answered  Patient verbalizes understanding and agrees to treatment plan.     Follow up: Return for if no better or worsening symptoms.

## 2025-06-09 ENCOUNTER — TELEPHONE (OUTPATIENT)
Dept: FAMILY MEDICINE CLINIC | Facility: CLINIC | Age: 65
End: 2025-06-09

## 2025-06-09 DIAGNOSIS — I25.83 CORONARY ARTERY DISEASE DUE TO LIPID RICH PLAQUE: ICD-10-CM

## 2025-06-09 DIAGNOSIS — E78.5 HYPERLIPIDEMIA, UNSPECIFIED HYPERLIPIDEMIA TYPE: Primary | ICD-10-CM

## 2025-06-09 DIAGNOSIS — Z12.5 SCREENING FOR PROSTATE CANCER: ICD-10-CM

## 2025-06-09 DIAGNOSIS — N41.1 CHRONIC PROSTATITIS: ICD-10-CM

## 2025-06-09 DIAGNOSIS — I25.10 CORONARY ARTERY DISEASE DUE TO LIPID RICH PLAQUE: ICD-10-CM

## 2025-06-09 DIAGNOSIS — Z00.00 HEALTH MAINTENANCE EXAMINATION: ICD-10-CM

## 2025-06-09 DIAGNOSIS — Z13.6 SCREENING FOR HEART DISEASE: ICD-10-CM

## 2025-06-09 DIAGNOSIS — Z13.1 SCREENING FOR DIABETES MELLITUS: ICD-10-CM

## 2025-06-09 NOTE — TELEPHONE ENCOUNTER
"    Caller: Graeme Lopez \"Duane\"    Relationship: Self    Best call back number: [phone]    What orders are you requesting (i.e. lab or imaging): ANNUAL LABS, WANTING TO MAKE SURE A PSA IS INCLUDED    In what timeframe would the patient need to come in: ASAP    Where will you receive your lab/imaging services:     Additional notes: PATIENT REQUESTING TO GET LABS DRAWN PRIOR TO PHYSICAL          "

## 2025-06-17 LAB
ALBUMIN SERPL-MCNC: 4.3 G/DL (ref 3.5–5.2)
ALBUMIN/GLOB SERPL: 1.8 G/DL
ALP SERPL-CCNC: 95 U/L (ref 39–117)
ALT SERPL-CCNC: 24 U/L (ref 1–41)
AST SERPL-CCNC: 25 U/L (ref 1–40)
BASOPHILS # BLD AUTO: 0.06 10*3/MM3 (ref 0–0.2)
BASOPHILS NFR BLD AUTO: 0.6 % (ref 0–1.5)
BILIRUB SERPL-MCNC: 0.5 MG/DL (ref 0–1.2)
BUN SERPL-MCNC: 15 MG/DL (ref 8–23)
BUN/CREAT SERPL: 13.8 (ref 7–25)
CALCIUM SERPL-MCNC: 9.8 MG/DL (ref 8.6–10.5)
CHLORIDE SERPL-SCNC: 103 MMOL/L (ref 98–107)
CHOLEST SERPL-MCNC: 137 MG/DL (ref 0–200)
CO2 SERPL-SCNC: 28.1 MMOL/L (ref 22–29)
CREAT SERPL-MCNC: 1.09 MG/DL (ref 0.76–1.27)
EGFRCR SERPLBLD CKD-EPI 2021: 76.3 ML/MIN/1.73
EOSINOPHIL # BLD AUTO: 0.37 10*3/MM3 (ref 0–0.4)
EOSINOPHIL NFR BLD AUTO: 4 % (ref 0.3–6.2)
ERYTHROCYTE [DISTWIDTH] IN BLOOD BY AUTOMATED COUNT: 11.9 % (ref 12.3–15.4)
GLOBULIN SER CALC-MCNC: 2.4 GM/DL
GLUCOSE SERPL-MCNC: 98 MG/DL (ref 65–99)
HBA1C MFR BLD: 5.8 % (ref 4.8–5.6)
HCT VFR BLD AUTO: 42 % (ref 37.5–51)
HDLC SERPL-MCNC: 58 MG/DL (ref 40–60)
HGB BLD-MCNC: 13.9 G/DL (ref 13–17.7)
IMM GRANULOCYTES # BLD AUTO: 0.03 10*3/MM3 (ref 0–0.05)
IMM GRANULOCYTES NFR BLD AUTO: 0.3 % (ref 0–0.5)
LDLC SERPL CALC-MCNC: 67 MG/DL (ref 0–100)
LYMPHOCYTES # BLD AUTO: 2.38 10*3/MM3 (ref 0.7–3.1)
LYMPHOCYTES NFR BLD AUTO: 25.5 % (ref 19.6–45.3)
MCH RBC QN AUTO: 29.5 PG (ref 26.6–33)
MCHC RBC AUTO-ENTMCNC: 33.1 G/DL (ref 31.5–35.7)
MCV RBC AUTO: 89.2 FL (ref 79–97)
MONOCYTES # BLD AUTO: 1.14 10*3/MM3 (ref 0.1–0.9)
MONOCYTES NFR BLD AUTO: 12.2 % (ref 5–12)
NEUTROPHILS # BLD AUTO: 5.34 10*3/MM3 (ref 1.7–7)
NEUTROPHILS NFR BLD AUTO: 57.4 % (ref 42.7–76)
NRBC BLD AUTO-RTO: 0 /100 WBC (ref 0–0.2)
PLATELET # BLD AUTO: 346 10*3/MM3 (ref 140–450)
POTASSIUM SERPL-SCNC: 4.9 MMOL/L (ref 3.5–5.2)
PROT SERPL-MCNC: 6.7 G/DL (ref 6–8.5)
PSA SERPL-MCNC: 1.08 NG/ML (ref 0–4)
RBC # BLD AUTO: 4.71 10*6/MM3 (ref 4.14–5.8)
SODIUM SERPL-SCNC: 139 MMOL/L (ref 136–145)
TRIGL SERPL-MCNC: 58 MG/DL (ref 0–150)
VLDLC SERPL CALC-MCNC: 12 MG/DL (ref 5–40)
WBC # BLD AUTO: 9.32 10*3/MM3 (ref 3.4–10.8)

## 2025-06-23 ENCOUNTER — OFFICE VISIT (OUTPATIENT)
Dept: FAMILY MEDICINE CLINIC | Facility: CLINIC | Age: 65
End: 2025-06-23
Payer: COMMERCIAL

## 2025-06-23 VITALS
HEART RATE: 69 BPM | DIASTOLIC BLOOD PRESSURE: 78 MMHG | TEMPERATURE: 97.6 F | SYSTOLIC BLOOD PRESSURE: 110 MMHG | OXYGEN SATURATION: 98 % | HEIGHT: 67 IN | BODY MASS INDEX: 22.54 KG/M2 | WEIGHT: 143.6 LBS

## 2025-06-23 DIAGNOSIS — Z00.00 HEALTH MAINTENANCE EXAMINATION: Primary | ICD-10-CM

## 2025-06-23 DIAGNOSIS — Z13.1 SCREENING FOR DIABETES MELLITUS: ICD-10-CM

## 2025-06-23 DIAGNOSIS — Z12.5 SCREENING FOR PROSTATE CANCER: ICD-10-CM

## 2025-06-23 DIAGNOSIS — B35.1 ONYCHOMYCOSIS: ICD-10-CM

## 2025-06-23 DIAGNOSIS — Z23 NEED FOR DIPHTHERIA-TETANUS-PERTUSSIS (TDAP) VACCINE: ICD-10-CM

## 2025-06-23 DIAGNOSIS — E78.5 HYPERLIPIDEMIA, UNSPECIFIED HYPERLIPIDEMIA TYPE: ICD-10-CM

## 2025-06-23 PROCEDURE — 99396 PREV VISIT EST AGE 40-64: CPT | Performed by: FAMILY MEDICINE

## 2025-06-23 PROCEDURE — 99213 OFFICE O/P EST LOW 20 MIN: CPT | Performed by: FAMILY MEDICINE

## 2025-06-23 RX ORDER — CICLOPIROX 80 MG/ML
SOLUTION TOPICAL NIGHTLY
Qty: 6 ML | Refills: 11 | Status: SHIPPED | OUTPATIENT
Start: 2025-06-23

## 2025-06-23 NOTE — PROGRESS NOTES
"Chief Complaint  Annual Exam (Doing well ,no complains had labs prior )    Subjective        Graeme Lopez presents to [facility]  History of Present Illness    History of Present Illness  Patient presents for annual exam toenail fungus.    General Well-being  Reports general well-being, active lifestyle with golf and social interactions. Acknowledges manageable aches and pains. Sleep pattern satisfactory with temazepam, taken in half doses mixed with banana or yogurt; has ~10 pills remaining. Maintains balanced diet with vegetables and proteins, satisfied with weight. Consumes sweet tea, replaced potatoes with sweet potatoes and white bread with wheat bread. Recalls elevated cardiac calcium score managed with atorvastatin. Stress test in 2022, no longer consults Dr. Chawla. Recovered from influenza in March 2025. Biannual dermatologist visits for basal cell carcinoma removal. Regular dentist and ophthalmologist visits.    Neck, Shoulder, and Hip Issues  Undergoing physical therapy for neck, shoulder, and hip issues, reports benefit.  - Onset: Not specified.  - Location: Neck, shoulder, and hip.  - Character: Issues requiring physical therapy.  - Alleviating Factors: Physical therapy reports benefit.    Toenail Fungus  Diagnosed with toenail fungus affecting at least half of toenails. Tried terbinafine from his dermatologist without success, interested in ciclopirox.  - Onset: Not specified.  - Location: Toenails.  - Character: Toenail fungus affecting at least half of toenails.  - Alleviating Factors: Tried terbinafine without success, interested in ciclopirox.    MEDICATIONS  Current: Temazepam, atorvastatin.  Past: Terbinafine.    IMMUNIZATIONS  He received his last tetanus vaccine on 06/17/2015.       Objective   Vital Signs:  /78   Pulse 69   Temp 97.6 °F (36.4 °C)   Ht 170.2 cm (67\")   Wt 65.1 kg (143 lb 9.6 oz)   SpO2 98%   BMI 22.49 kg/m²   Estimated body mass " "index is 22.49 kg/m² as calculated from the following:    Height as of this encounter: 170.2 cm (67\").    Weight as of this encounter: 65.1 kg (143 lb 9.6 oz).    BMI is within normal parameters. No other follow-up for BMI required.      Physical Exam  Vitals and nursing note reviewed.   Constitutional:       General: He is not in acute distress.     Appearance: Normal appearance. He is well-developed.   HENT:      Head: Normocephalic.      Right Ear: Tympanic membrane and ear canal normal.      Left Ear: Tympanic membrane and ear canal normal.      Nose: Nose normal.   Cardiovascular:      Rate and Rhythm: Normal rate and regular rhythm.      Heart sounds: Normal heart sounds. No murmur heard.  Pulmonary:      Effort: Pulmonary effort is normal. No respiratory distress.      Breath sounds: Normal breath sounds.   Abdominal:      General: Abdomen is flat. There is no distension.      Palpations: Abdomen is soft.      Tenderness: There is no abdominal tenderness.   Musculoskeletal:         General: Normal range of motion.   Skin:     General: Skin is warm and dry.      Findings: No rash.   Neurological:      Mental Status: He is alert and oriented to person, place, and time.   Psychiatric:         Mood and Affect: Mood normal.         Behavior: Behavior normal.         Thought Content: Thought content normal.         Judgment: Judgment normal.            Result Review :  The following data was reviewed by: Bianka Raymond MD on 06/23/2025:         PSA Screen (06/17/2025 08:47)  Hemoglobin A1c (06/17/2025 08:47)  Lipid Panel (06/17/2025 08:47)  Comprehensive Metabolic Panel (06/17/2025 08:47)  CBC & Differential (06/17/2025 08:47)       Assessment and Plan   Diagnoses and all orders for this visit:    1. Health maintenance examination (Primary)    2. Need for diphtheria-tetanus-pertussis (Tdap) vaccine  -     Cancel: Tdap Vaccine => 6yo IM (BOOSTRIX/ADACEL)    3. Onychomycosis  -     ciclopirox (PENLAC) 8 % solution; " Apply  topically to the appropriate area as directed Every Night.  Dispense: 6 mL; Refill: 11    4. Hyperlipidemia, unspecified hyperlipidemia type  -     Lipid Panel; Future  -     CBC & Differential; Future    5. Screening for diabetes mellitus  -     Hemoglobin A1c; Future  -     Comprehensive Metabolic Panel; Future    6. Screening for prostate cancer  -     PSA Screen; Future           Assessment & Plan  1. Annual examination  - A1c slightly elevated, prediabetic range, no medication needed  - Blood counts normal except monocytes, possibly allergy-related, not significant  - Kidney function, electrolytes, protein levels, liver enzymes, cholesterol satisfactory  - Prostate cancer screening normal  - Atorvastatin effectively reduced LDL to <70  - Last tetanus vaccine 06/17/2015  - Advised to maintain healthy diet, regular protein intake, small frequent meals  - Continue atorvastatin  - Schedule EKG every 5 years, stress test in 2 years  - patient agreed to receiving tetanus today but unfortunately I forgot before walking him out of the office, will call him back to see if we can set this set up.  - Seek immediate medical attention for exertional symptoms (shortness of breath, chest pain, indigestion, bloating, nausea)  - Initiate biannual follow-ups if A1c approaches 6    2. Neck issues  - Physical therapy significantly improved neck issues  - Continue as needed    3. Shoulder issues  - Undergoing physical therapy for shoulder issues  - Continue as needed    4. Hip issues  - Undergoing physical therapy for hip issues  - Continue as needed    5.  Onychomycosis on multiple toes  - Tried terbinafine without success, interested in ciclopirox  - Prescription for ciclopirox with 11 refills sent to [facility] on [address]  - Use for at least 6 months, longer use may be necessary    Follow-up  - Follow up in 1 year      Follow Up   Return in about 1 year (around 6/23/2026), or if symptoms worsen or fail to improve, for  Annual Physical with fasting labs prior.  Patient was given instructions and counseling regarding his condition or for health maintenance advice. Please see specific information pulled into the AVS if appropriate.         Bianka Raymond MD      Patient or patient representative verbalized consent for the use of Ambient Listening during the visit with  Bianka Raymond MD for chart documentation. 6/23/2025  17:37 EDT

## 2025-06-23 NOTE — Clinical Note
These call patient back to schedule Tdap vaccine, I did intend to do this when he was in the office today but fortunately forgot and walked him.  We can do a nurse appointment for this to his availability.

## 2025-06-26 ENCOUNTER — CLINICAL SUPPORT (OUTPATIENT)
Dept: FAMILY MEDICINE CLINIC | Facility: CLINIC | Age: 65
End: 2025-06-26
Payer: COMMERCIAL

## 2025-06-26 DIAGNOSIS — Z23 NEED FOR VACCINATION: Primary | ICD-10-CM

## (undated) DEVICE — SNAR POLYP SENSATION STDOVL 27 240 BX40

## (undated) DEVICE — TUBING, SUCTION, 1/4" X 10', STRAIGHT: Brand: MEDLINE

## (undated) DEVICE — THE SINGLE USE ETRAP – POLYP TRAP IS USED FOR SUCTION RETRIEVAL OF ENDOSCOPICALLY REMOVED POLYPS.: Brand: ETRAP

## (undated) DEVICE — KT VLV BIOGUARD SXN BIOP AIR/H20 CONN 4PC DISP

## (undated) DEVICE — SENSR O2 OXIMAX FNGR A/ 18IN NONSTR

## (undated) DEVICE — THE TORRENT IRRIGATION SCOPE CONNECTOR IS USED WITH THE TORRENT IRRIGATION TUBING TO PROVIDE IRRIGATION FLUIDS SUCH AS STERILE WATER DURING GASTROINTESTINAL ENDOSCOPIC PROCEDURES WHEN USED IN CONJUNCTION WITH AN IRRIGATION PUMP (OR ELECTROSURGICAL UNIT).: Brand: TORRENT

## (undated) DEVICE — CANN NASL CO2 TRULINK W/O2 A/

## (undated) DEVICE — SINGLE-USE BIOPSY FORCEPS: Brand: RADIAL JAW 4